# Patient Record
Sex: FEMALE | Race: WHITE | NOT HISPANIC OR LATINO | Employment: FULL TIME | URBAN - METROPOLITAN AREA
[De-identification: names, ages, dates, MRNs, and addresses within clinical notes are randomized per-mention and may not be internally consistent; named-entity substitution may affect disease eponyms.]

---

## 2017-02-14 ENCOUNTER — OFFICE VISIT (OUTPATIENT)
Dept: URGENT CARE | Facility: URGENT CARE | Age: 37
End: 2017-02-14
Payer: COMMERCIAL

## 2017-02-14 VITALS
OXYGEN SATURATION: 98 % | DIASTOLIC BLOOD PRESSURE: 72 MMHG | HEART RATE: 128 BPM | BODY MASS INDEX: 20.76 KG/M2 | SYSTOLIC BLOOD PRESSURE: 122 MMHG | TEMPERATURE: 102.6 F | WEIGHT: 128.6 LBS

## 2017-02-14 DIAGNOSIS — R50.9 FEVER, UNSPECIFIED: ICD-10-CM

## 2017-02-14 DIAGNOSIS — R07.0 THROAT PAIN: ICD-10-CM

## 2017-02-14 DIAGNOSIS — J10.1 INFLUENZA A: Primary | ICD-10-CM

## 2017-02-14 LAB
DEPRECATED S PYO AG THROAT QL EIA: NORMAL
FLUAV+FLUBV AG SPEC QL: ABNORMAL
FLUAV+FLUBV AG SPEC QL: ABNORMAL
MICRO REPORT STATUS: NORMAL
SPECIMEN SOURCE: ABNORMAL
SPECIMEN SOURCE: NORMAL

## 2017-02-14 PROCEDURE — 87880 STREP A ASSAY W/OPTIC: CPT | Performed by: NURSE PRACTITIONER

## 2017-02-14 PROCEDURE — 87804 INFLUENZA ASSAY W/OPTIC: CPT | Performed by: NURSE PRACTITIONER

## 2017-02-14 PROCEDURE — 99213 OFFICE O/P EST LOW 20 MIN: CPT | Performed by: NURSE PRACTITIONER

## 2017-02-14 PROCEDURE — 87081 CULTURE SCREEN ONLY: CPT | Performed by: NURSE PRACTITIONER

## 2017-02-14 RX ORDER — OSELTAMIVIR PHOSPHATE 75 MG/1
75 CAPSULE ORAL 2 TIMES DAILY
Qty: 10 CAPSULE | Refills: 0 | Status: SHIPPED | OUTPATIENT
Start: 2017-02-14 | End: 2017-02-19

## 2017-02-14 NOTE — MR AVS SNAPSHOT
After Visit Summary   2/14/2017    Maria A Osuna    MRN: [id number]           Patient Information     Date Of Birth          1980        Visit Information        Provider Department      2/14/2017 4:10 PM Amalia Navarro NP [facility]        Today's Diagnoses     Fever, unspecified    -  1    Throat pain        Influenza A          Care Instructions      2009 H1N1 Influenza (Swine Flu) (Adult)  The 2009 H1N1 influenza (swine flu) is a respiratory disease caused by the influenza A (H1N1) strain of the influenza viruses. Scientists originally thought the virus came from pigs (swine). But it is now known that this is not the case. This germ is a virus that was discovered in 2009. The 2009 H1N1 flu virus can be passed among people the same way the regular flu spreads--through droplets that form when someone with the virus coughs, sneezes, laughs, or talks. These droplets pass from person to person through the air. You can also become infected if you touch a surface on which the droplets have landed and then transfer the virus to your eyes, nose, or mouth.  2009 H1N1 flu symptoms are about the same as regular flu symptoms. These include fever and chills, headache, body and muscle aches, dry cough, runny nose and weakness. You may also have sore throat, diarrhea, or vomiting.  There is no way to know for sure in the emergency department whether you have 2009 H1N1 or another type of influenza. If the 2009 H1N1 flu is in your area, you may be tested for it. You will be notified when the test results come back. In the meantime, follow the instructions you are given, including the measures below.  Home Care  Note: Seek Immediate Medical Care If You Re Having A Hard Time Breathing Or Are Breathing Very Fast, Or If You Re Starting To Get Confused. (See additional emergency warning signs below under Get Prompt Medical Attention.)  Medications:     If the 2009 H1N1 flu is in your area  and your symptoms are severe, the doctor may prescribe medications called antivirals. These must be taken within 2 days of when your symptoms started. Antivirals work by stopping the virus from reproducing in your body. This gives your body s immune system a chance to fight the virus. After taking the medication, your symptoms may be milder and you may recover quicker than without the medication. The medication may also prevent serious complications such as pneumonia. Mild side effects from these drugs occasionally occur (the chance of side effects is 5% to 10%). Serious side effects are rare. The doctor will decide if this medication is needed. Follow your doctor s instructions for taking these medications. Take ALL medication as prescribed until it is gone.    If your symptoms are mild or it has been more than 2 days since your symptoms started, the doctor will likely not prescribe medications.    Antibiotics are NOT helpful against influenza.    You may use acetaminophen (Tylenol) or ibuprofen (Motrin, Advil) to control fever and muscle aching. (NOTE: If you have chronic liver or kidney disease or ever had a stomach ulcer or GI bleeding, talk with your doctor before using these medicines.) Do not give aspirin to anyone under 18 years of age who is ill with a fever. It may cause severe liver damage.    A vaccine against 2009 H1N1 flu is available. Talk to your doctor about whether the vaccine is right for you.  General Care:     Unless told otherwise by your doctor, drink plenty of non-alcoholic fluids, such as water or juice, to prevent dehydration. A good rule is to drink enough so that you urinate your normal amount.    Get plenty of rest.  Preventing The Spread:     Wash your hands often, especially after coughing or sneezing. Or, clean your hands with an alcohol-based hand gel containing at least 60 percent alcohol.    Cough or sneeze into a tissue. Then throw the tissue away and wash your hands. If you don t  have a tissue, cough or sneeze into the crook of your elbow. Ask your healthcare provider whether you should wear a medical facemask over your mouth and nose to help prevent spreading the virus.    Stay home until your fever has been gone for at least 24 hours and you don t have fever symptoms (such as chills). Be sure the fever isn t being hidden by fever-reducing medications (such as acetaminophen or ibuprofen).    Don t share food, utensils, drinking glasses, or a toothbrush with others.    Have family members wash their hands often. They should avoid touching their eyes, nose, and mouth as much as possible.    Ask your doctor whether others in your household should receive antiviral medication to help them avoid infection.  Follow Up  with your doctor or as directed by our staff if you are not improving over the next week. You will be told how to get test results.  Note:  2009 H1N1 flu is not caused by eating pork or pork products. Eating pork or pork products that have been properly handled and cooked is safe. To learn more about 2009 H1N1 flu, visit the Centers for Disease Control and Prevention (CDC) website: www.cdc.gov/flu/swineflu/index.htm.  Get Prompt Medical Attention  if any of the following occur:    Trouble breathing or shortness of breath    Dizziness or lightheadedness    Chest pain or pressure    Confusion, behavior change, or seizure    Severe or repeated vomiting    4082-8949 [employer]. [address] [place] [address]. All rights reserved. This information is not intended as a substitute for professional medical care. Always follow your healthcare professional's instructions.              Follow-ups after your visit        Who to contact     If you have questions or need follow up information about today's clinic visit or your schedule please contact [facility] directly at [phone].  Normal or non-critical lab and imaging results will be  "communicated to you by [employer], letter or phone within 4 business days after the clinic has received the results. If you do not hear from us within 7 days, please contact the clinic through [facility] [employer] [facility] or phone. If you have a critical or abnormal lab result, we will notify you by phone as soon as possible.  Submit refill requests through [employer] [facility] or call your pharmacy and they will forward the refill request to us. Please allow 3 business days for your refill to be completed.          Additional Information About Your Visit        [facility] [employer] Information     [facility] lets you send messages to your doctor, view your test results, renew your prescriptions, schedule appointments and more. To sign up, go to [email or web address] . Click on \"Log in\" on the left side of the screen, which will take you to the Welcome page. Then click on \"Sign up Now\" on the right side of the page.     You will be asked to enter the access code listed below, as well as some personal information. Please follow the directions to create your username and password.     Your access code is: [email or web address]  Expires: 5/15/2017  6:52 PM     Your access code will  in 90 days. If you need help or a new code, please call your [facility] clinic or [phone].        Care EveryWhere ID     This is your Care EveryWhere ID. This could be used by other organizations to access your [facility] medical records  [id number]        Your Vitals Were     Pulse Temperature Pulse Oximetry BMI (Body Mass Index)          128 102.6  F (39.2  C) (Oral) 98% 20.76 kg/m2         Blood Pressure from Last 3 Encounters:   17 122/72   16 108/74   16 100/60    Weight from Last 3 Encounters:   17 128 lb 9.6 oz (58.3 kg)   16 126 lb (57.2 kg)   16 122 lb 8 oz (55.6 kg)              We Performed the Following     Influenza A/B antigen     Strep, Rapid Screen          Today's Medication Changes          These changes are accurate as of: 17  6:52 " PM.  If you have any questions, ask your nurse or doctor.               Start taking these medicines.        Dose/Directions    oseltamivir 75 MG capsule   Commonly known as:  TAMIFLU   Used for:  Influenza A   Started by:  Amalia Navarro NP        Dose:  75 mg   Take 1 capsule (75 mg) by mouth 2 times daily for 5 days   Quantity:  10 capsule   Refills:  0            Where to get your medicines      These medications were sent to [facility] - [place], [place] - [address]  [address], [facility] [address]     Phone:  [phone]     oseltamivir 75 MG capsule                Primary Care Provider Office Phone # Fax #    Sharon Tijerina [phone]       [facility] [address]  [facility] [address]        Thank you!     Thank you for choosing [facility]  for your care. Our goal is always to provide you with excellent care. Hearing back from our patients is one way we can continue to improve our services. Please take a few minutes to complete the written survey that you may receive in the mail after your visit with us. Thank you!             Your Updated Medication List - Protect others around you: Learn how to safely use, store and throw away your medicines at www.disposemymeds.org.          This list is accurate as of: 2/14/17  6:52 PM.  Always use your most recent med list.                   Brand Name Dispense Instructions for use    FLINSTONES GUMMIES OMEGA-3 DHA PO      Take 2 tablets by mouth daily Reported on 2/14/2017       oseltamivir 75 MG capsule    TAMIFLU    10 capsule    Take 1 capsule (75 mg) by mouth 2 times daily for 5 days       TYLENOL PO      Take 1,000 mg by mouth as needed for mild pain or fever

## 2017-02-15 NOTE — PATIENT INSTRUCTIONS
2009 H1N1 Influenza (Swine Flu) (Adult)  The 2009 H1N1 influenza (swine flu) is a respiratory disease caused by the influenza A (H1N1) strain of the influenza viruses. Scientists originally thought the virus came from pigs (swine). But it is now known that this is not the case. This germ is a virus that was discovered in 2009. The 2009 H1N1 flu virus can be passed among people the same way the regular flu spreads--through droplets that form when someone with the virus coughs, sneezes, laughs, or talks. These droplets pass from person to person through the air. You can also become infected if you touch a surface on which the droplets have landed and then transfer the virus to your eyes, nose, or mouth.  2009 H1N1 flu symptoms are about the same as regular flu symptoms. These include fever and chills, headache, body and muscle aches, dry cough, runny nose and weakness. You may also have sore throat, diarrhea, or vomiting.  There is no way to know for sure in the emergency department whether you have 2009 H1N1 or another type of influenza. If the 2009 H1N1 flu is in your area, you may be tested for it. You will be notified when the test results come back. In the meantime, follow the instructions you are given, including the measures below.  Home Care  Note: Seek Immediate Medical Care If You Re Having A Hard Time Breathing Or Are Breathing Very Fast, Or If You Re Starting To Get Confused. (See additional emergency warning signs below under Get Prompt Medical Attention.)  Medications:     If the 2009 H1N1 flu is in your area and your symptoms are severe, the doctor may prescribe medications called antivirals. These must be taken within 2 days of when your symptoms started. Antivirals work by stopping the virus from reproducing in your body. This gives your body s immune system a chance to fight the virus. After taking the medication, your symptoms may be milder and you may recover quicker than without the medication.  The medication may also prevent serious complications such as pneumonia. Mild side effects from these drugs occasionally occur (the chance of side effects is 5% to 10%). Serious side effects are rare. The doctor will decide if this medication is needed. Follow your doctor s instructions for taking these medications. Take ALL medication as prescribed until it is gone.    If your symptoms are mild or it has been more than 2 days since your symptoms started, the doctor will likely not prescribe medications.    Antibiotics are NOT helpful against influenza.    You may use acetaminophen (Tylenol) or ibuprofen (Motrin, Advil) to control fever and muscle aching. (NOTE: If you have chronic liver or kidney disease or ever had a stomach ulcer or GI bleeding, talk with your doctor before using these medicines.) Do not give aspirin to anyone under 18 years of age who is ill with a fever. It may cause severe liver damage.    A vaccine against 2009 H1N1 flu is available. Talk to your doctor about whether the vaccine is right for you.  General Care:     Unless told otherwise by your doctor, drink plenty of non-alcoholic fluids, such as water or juice, to prevent dehydration. A good rule is to drink enough so that you urinate your normal amount.    Get plenty of rest.  Preventing The Spread:     Wash your hands often, especially after coughing or sneezing. Or, clean your hands with an alcohol-based hand gel containing at least 60 percent alcohol.    Cough or sneeze into a tissue. Then throw the tissue away and wash your hands. If you don t have a tissue, cough or sneeze into the crook of your elbow. Ask your healthcare provider whether you should wear a medical facemask over your mouth and nose to help prevent spreading the virus.    Stay home until your fever has been gone for at least 24 hours and you don t have fever symptoms (such as chills). Be sure the fever isn t being hidden by fever-reducing medications (such as  acetaminophen or ibuprofen).    Don t share food, utensils, drinking glasses, or a toothbrush with others.    Have family members wash their hands often. They should avoid touching their eyes, nose, and mouth as much as possible.    Ask your doctor whether others in your household should receive antiviral medication to help them avoid infection.  Follow Up  with your doctor or as directed by our staff if you are not improving over the next week. You will be told how to get test results.  Note:  2009 H1N1 flu is not caused by eating pork or pork products. Eating pork or pork products that have been properly handled and cooked is safe. To learn more about 2009 H1N1 flu, visit the Centers for Disease Control and Prevention (CDC) website: www.cdc.gov/flu/swineflu/index.htm.  Get Prompt Medical Attention  if any of the following occur:    Trouble breathing or shortness of breath    Dizziness or lightheadedness    Chest pain or pressure    Confusion, behavior change, or seizure    Severe or repeated vomiting    7227-4793 [employer]. [address] [place] [address]. All rights reserved. This information is not intended as a substitute for professional medical care. Always follow your healthcare professional's instructions.

## 2017-02-15 NOTE — PROGRESS NOTES
SUBJECTIVE:                                                    Maria A Osuna is a 36 year old female who presents to clinic today for the following health issues:      RESPIRATORY SYMPTOMS      Duration: 1 week    Description  rhinorrhea, sore throat, facial pain/pressure, cough, wheezing, fever, chills, ear pain both, headache, fatigue/malaise and myalgias    Severity: moderate    Accompanying signs and symptoms: None    History (predisposing factors):  none    Precipitating or alleviating factors: None    Therapies tried and outcome:  rest and fluids nasal spray/wash         No Known Allergies    Past Medical History   Diagnosis Date     Irritable bowel syndrome 5/12/2015         Current Outpatient Prescriptions on File Prior to Visit:  Acetaminophen (TYLENOL PO) Take 1,000 mg by mouth as needed for mild pain or fever   Pediatric Multiple Vit-C-FA (FLINSTONES GUMMIES OMEGA-3 DHA PO) Take 2 tablets by mouth daily Reported on 2/14/2017     Current Facility-Administered Medications on File Prior to Visit:  ROPivacaine (NAROPIN) epidural   lidocaine-EPINEPHrine 1.5 %-[id number] injection       Social History   Substance Use Topics     Smoking status: Never Smoker     Smokeless tobacco: Never Used     Alcohol use No       ROS:  Consitutional: As above  ENT: As above  Respiratory: As above    OBJECTIVE:  /72 (BP Location: Left arm, Patient Position: Chair, Cuff Size: Adult Regular)  Pulse 128  Temp 102.6  F (39.2  C) (Oral)  Wt 128 lb 9.6 oz (58.3 kg)  SpO2 98%  BMI 20.76 kg/m2  GENERAL APPEARANCE: healthy, alert and no distress  EYES: conjunctiva clear  EARS:small cerumen.   Ear canals w/o erythema, TM's intact w/o erythema.    NOSE/MOUTH: Nose and mouth without ulcers, erythema or lesions  THROAT: mild erythema w/ no tonsillar enlargement . no exudates  NECK: supple, nontender, no lymphadenopathy  RESP: lungs clear to auscultation - no rales, rhonchi or wheezes  CV: regular rates and rhythm, normal S1  S2, no murmur noted  NEURO: awake, alert        Results for orders placed or performed in visit on 02/14/17 (from the past 24 hour(s))   Strep, Rapid Screen   Result Value Ref Range    Specimen Description Throat     Rapid Strep A Screen       NEGATIVE: No Group A streptococcal antigen detected by immunoassay, await   culture report.      Micro Report Status FINAL 02/14/2017    Influenza A/B antigen   Result Value Ref Range    Influenza A/B Agn Specimen Nasal     Influenza A (A) NEG     Positive   Test results must be correlated with clinical data. If necessary, results   should be confirmed by a molecular assay or viral culture.      Influenza B  NEG     Negative   Test results must be correlated with clinical data. If necessary, results   should be confirmed by a molecular assay or viral culture.       ASSESSMENT: ill appearing.    ICD-10-CM    1. Influenza A J10.1 oseltamivir (TAMIFLU) 75 MG capsule   2. Fever, unspecified R50.9 Influenza A/B antigen   3. Throat pain R07.0 Strep, Rapid Screen         PLAN:  I discussed the pathophysiology of influenza and viral etiology.  I reviewed the risks and benefits of various over the counter and prescription medications.  Additionally, we reviewed the infectious nature of this condition and techniques to minimize transmission and future infections. I discussed warning signs of worsening infection, if he deviates from the expected course, he will contact us.  I also discussed signs and symptoms of the flu, if other household contacts develop these symptoms, they are to come into the clinic immediately, as anti-viral medications need to be started within the first 48 hours.      Lots of rest and fluids.  RTC if any worsening symptoms or if not improving.    Amalia Navarro [facility]

## 2017-02-15 NOTE — NURSING NOTE
"Chief Complaint   Patient presents with     URI       Initial /72 (BP Location: Left arm, Patient Position: Chair, Cuff Size: Adult Regular)  Pulse 128  Temp 102.6  F (39.2  C) (Oral)  Wt 128 lb 9.6 oz (58.3 kg)  SpO2 98%  BMI 20.76 kg/m2 Estimated body mass index is 20.76 kg/(m^2) as calculated from the following:    Height as of 2/8/16: 5' 6\" (1.676 m).    Weight as of this encounter: 128 lb 9.6 oz (58.3 kg).  Medication Reconciliation: complete     Shivani Reis MA    "

## 2017-02-16 LAB
BACTERIA SPEC CULT: NORMAL
MICRO REPORT STATUS: NORMAL
SPECIMEN SOURCE: NORMAL

## 2017-03-27 ENCOUNTER — OFFICE VISIT (OUTPATIENT)
Dept: FAMILY MEDICINE | Facility: CLINIC | Age: 37
End: 2017-03-27
Payer: COMMERCIAL

## 2017-03-27 VITALS
WEIGHT: 127.4 LBS | SYSTOLIC BLOOD PRESSURE: 123 MMHG | TEMPERATURE: 98.1 F | HEIGHT: 66 IN | BODY MASS INDEX: 20.48 KG/M2 | DIASTOLIC BLOOD PRESSURE: 78 MMHG | HEART RATE: 96 BPM | OXYGEN SATURATION: 99 %

## 2017-03-27 DIAGNOSIS — R07.0 THROAT PAIN: Primary | ICD-10-CM

## 2017-03-27 DIAGNOSIS — K12.0 ORAL APHTHOUS ULCER: ICD-10-CM

## 2017-03-27 LAB
DEPRECATED S PYO AG THROAT QL EIA: NORMAL
MICRO REPORT STATUS: NORMAL
SPECIMEN SOURCE: NORMAL

## 2017-03-27 PROCEDURE — 87081 CULTURE SCREEN ONLY: CPT | Performed by: NURSE PRACTITIONER

## 2017-03-27 PROCEDURE — 99214 OFFICE O/P EST MOD 30 MIN: CPT | Performed by: NURSE PRACTITIONER

## 2017-03-27 PROCEDURE — 87880 STREP A ASSAY W/OPTIC: CPT | Performed by: NURSE PRACTITIONER

## 2017-03-27 NOTE — PATIENT INSTRUCTIONS
Use mouth wash or salt water gargling to help lesion resolve quicker/ pain resolution. Follow up if symptoms persit or worsen, then I will place an order for ENT evaluation.   Self-Care for Sore Throats  Sore throats occur for many reasons, such as colds, allergies, and infections caused by viruses or bacteria. In any case, your throat becomes red and sore. Your goal for self-care is to reduce your discomfort while giving your throat a chance to heal.    Moisten and Soothe Your Throat    Try a sip of water first thing after waking up.    Keep your throat moist by drinking 6 or more glasses of clear liquids every day.    Run a cool-air humidifier in your room overnight.    Avoid cigarette smoke.     Suck on throat lozenges, cough drops, hard candy, ice chips, or frozen fruit-juice bars. Use the sugar-free versions if your diet or medical condition require them.  Gargle to Ease Irritation  Gargling every hour or 2 can ease irritation. Try gargling with 1 of these solutions:    1/4 teaspoon of salt in 1/2 cup of warm water    An over-the-counter anesthetic gargle  Use Medication for More Relief  Over-the-counter medication can reduce sore throat symptoms. Ask your pharmacist if you have questions about which medication to use:    Ease pain with anesthetic sprays. Aspirin or an aspirin substitute also helps. Remember, never give aspirin to anyone 18 or younger, or if you are already taking blood thinners.     For sore throats caused by allergies, try antihistamines to block the allergic reaction.    Remember: unless a sore throat is caused by a bacterial infection, antibiotics won t help you.  Prevent Future Sore Throats    Stop smoking or reduce contact with secondhand smoke. Smoke irritates the tender throat lining.    Limit contact with pets and with allergy-causing substances, such as pollen and mold.    When you re around someone with a sore throat or cold, wash your hands frequently to keep viruses or bacteria  from spreading.    Don t strain your vocal cords.  Call Your Health Care Provider  Contact your doctor if you have:    A temperature over 101 F (38.3 C)    White spots on the throat    Great difficulty swallowing    Trouble breathing    A skin rash    Recent exposure to someone else with strep bacteria    Severe hoarseness and swollen glands in the neck or jaw     7485-7942 [employer]. [address] [place] [address]. All rights reserved. This information is not intended as a substitute for professional medical care. Always follow your healthcare professional's instructions.

## 2017-03-27 NOTE — NURSING NOTE
"Chief Complaint   Patient presents with     Ent Problem       Initial /78  Pulse 96  Temp 98.1  F (36.7  C) (Oral)  Ht 5' 6.14\" (1.68 m)  Wt 127 lb 6.4 oz (57.8 kg)  LMP 03/23/2017 (Approximate)  SpO2 99%  Breastfeeding? No  BMI 20.47 kg/m2 Estimated body mass index is 20.47 kg/(m^2) as calculated from the following:    Height as of this encounter: 5' 6.14\" (1.68 m).    Weight as of this encounter: 127 lb 6.4 oz (57.8 kg).  Medication Reconciliation: complete     Rosa Isela Coffey MA  "

## 2017-03-27 NOTE — MR AVS SNAPSHOT
After Visit Summary   3/27/2017    Maria A Osuna    MRN: [id number]           Patient Information     Date Of Birth          1980        Visit Information        Provider Department      3/27/2017 9:20 AM Maria A Keys NP [facility]        Today's Diagnoses     Throat pain    -  1      Care Instructions    Use mouth wash or salt water gargling to help lesion resolve quicker/ pain resolution. Follow up if symptoms persit or worsen, then I will place an order for ENT evaluation.   Self-Care for Sore Throats  Sore throats occur for many reasons, such as colds, allergies, and infections caused by viruses or bacteria. In any case, your throat becomes red and sore. Your goal for self-care is to reduce your discomfort while giving your throat a chance to heal.    Moisten and Soothe Your Throat    Try a sip of water first thing after waking up.    Keep your throat moist by drinking 6 or more glasses of clear liquids every day.    Run a cool-air humidifier in your room overnight.    Avoid cigarette smoke.     Suck on throat lozenges, cough drops, hard candy, ice chips, or frozen fruit-juice bars. Use the sugar-free versions if your diet or medical condition require them.  Gargle to Ease Irritation  Gargling every hour or 2 can ease irritation. Try gargling with 1 of these solutions:    1/4 teaspoon of salt in 1/2 cup of warm water    An over-the-counter anesthetic gargle  Use Medication for More Relief  Over-the-counter medication can reduce sore throat symptoms. Ask your pharmacist if you have questions about which medication to use:    Ease pain with anesthetic sprays. Aspirin or an aspirin substitute also helps. Remember, never give aspirin to anyone 18 or younger, or if you are already taking blood thinners.     For sore throats caused by allergies, try antihistamines to block the allergic reaction.    Remember: unless a sore throat is caused by a bacterial infection,  antibiotics won t help you.  Prevent Future Sore Throats    Stop smoking or reduce contact with secondhand smoke. Smoke irritates the tender throat lining.    Limit contact with pets and with allergy-causing substances, such as pollen and mold.    When you re around someone with a sore throat or cold, wash your hands frequently to keep viruses or bacteria from spreading.    Don t strain your vocal cords.  Call Your Health Care Provider  Contact your doctor if you have:    A temperature over 101 F (38.3 C)    White spots on the throat    Great difficulty swallowing    Trouble breathing    A skin rash    Recent exposure to someone else with strep bacteria    Severe hoarseness and swollen glands in the neck or jaw     7933-3451 [employer]. [address] [place] [address]. All rights reserved. This information is not intended as a substitute for professional medical care. Always follow your healthcare professional's instructions.              Follow-ups after your visit        Follow-up notes from your care team     Return if symptoms worsen or fail to improve.      Who to contact     Normal or non-critical lab and imaging results will be communicated to you by [employer], letter or phone within 4 business days after the clinic has received the results. If you do not hear from us within 7 days, please contact the clinic through [employer] or phone. If you have a critical or abnormal lab result, we will notify you by phone as soon as possible.  Submit refill requests through [employer] or call your pharmacy and they will forward the refill request to us. Please allow 3 business days for your refill to be completed.          If you need to speak with a  for additional information , please call: [phone]             Additional Information About Your Visit        [employer] Information     [employer] gives you secure access to your electronic health record. If you see a primary care provider,  "you can also send messages to your care team and make appointments. If you have questions, please call your primary care clinic.  If you do not have a primary care provider, please call [phone] and they will assist you.        Care EveryWhere ID     This is your Care EveryWhere ID. This could be used by other organizations to access your [facility] medical records  [id number]        Your Vitals Were     Pulse Temperature Height Last Period Pulse Oximetry Breastfeeding?    96 98.1  F (36.7  C) (Oral) 5' 6.14\" (1.68 m) 03/23/2017 (Approximate) 99% No    BMI (Body Mass Index)                   20.47 kg/m2            Blood Pressure from Last 3 Encounters:   03/27/17 123/78   02/14/17 122/72   12/31/16 108/74    Weight from Last 3 Encounters:   03/27/17 127 lb 6.4 oz (57.8 kg)   02/14/17 128 lb 9.6 oz (58.3 kg)   12/31/16 126 lb (57.2 kg)              We Performed the Following     Strep, Rapid Screen          Today's Medication Changes          These changes are accurate as of: 3/27/17  9:28 AM.  If you have any questions, ask your nurse or doctor.               Start taking these medicines.        Dose/Directions    FIRST-DELIA MOUTHWASH Susp   Used for:  Throat pain   Started by:  Maria A Keys NP        Dose:  5-10 mL   Swish and swallow 5-10 mLs in mouth every 6 hours as needed   Quantity:  237 mL   Refills:  1         Stop taking these medicines if you haven't already. Please contact your care team if you have questions.     FLINSTONES GUMMIES OMEGA-3 DHA PO   Stopped by:  Maria A Keys NP           TYLENOL PO   Stopped by:  Maria A Keys NP                Where to get your medicines      These medications were sent to [facility] [address] [facility] [place]  [address] [facility] [place] [address]     Phone:  [phone]     FIRST-DELIA MOUTHWASH Susp                Primary Care Provider Office Phone # Fax #    Sharon Edwina Tijerina [phone]       [facility] " [facility] [address]  [facility] [address]        Thank you!     Thank you for choosing [facility]  for your care. Our goal is always to provide you with excellent care. Hearing back from our patients is one way we can continue to improve our services. Please take a few minutes to complete the written survey that you may receive in the mail after your visit with us. Thank you!             Your Updated Medication List - Protect others around you: Learn how to safely use, store and throw away your medicines at www.disposemymeds.org.          This list is accurate as of: 3/27/17  9:28 AM.  Always use your most recent med list.                   Brand Name Dispense Instructions for use    FIRST-DEILA MOUTHWASH Susp     237 mL    Swish and swallow 5-10 mLs in mouth every 6 hours as needed

## 2017-03-27 NOTE — PROGRESS NOTES
SUBJECTIVE:  Maria A Osuna is a 36 year old female who presents with the following concerns;              Symptoms: cc Present Absent Comment   Fever/Chills       Fatigue       Muscle Aches       Eye Irritation       Sneezing       Nasal Quinn/Drg       Sinus Pressure/Pain       Loss of smell       Dental pain       Sore Throat       Swollen Glands  x  Left side swollen gland, pain with swollowing   Ear Pain/Fullness  x  Left ear   Cough       Wheeze       Chest Pain       Shortness of breath       Rash       Other         Symptom duration:  2 months   Sympom severity:  mild   Treatments tried:  had been treated for flu and sinus infection   Contacts:  none     Was recently treated for influenza and sinus infection    Medications updated and reviewed.  Past, family and surgical history is updated and reviewed in the record.  Patient Active Problem List    Diagnosis Date Noted     Irritable bowel syndrome 05/12/2015     Priority: Medium     Past Medical History:   Diagnosis Date     Irritable bowel syndrome 5/12/2015      History reviewed. No pertinent family history.  ROS:  Other than noted above, general, HEENT, respiratory, cardiac and gastrointestinal systems are negative.    OBJECTIVE:  GENERAL:  Alert, no acute distress  EYES:  PERRL, EOM normal, conjunctiva and lids normal  HEENT:  Ears and TMs normal, oral mucosa and posterior oropharynx normal, small viral lesion to L tonsillar area. Tonsils are not erythematous or enlarged, no tonsil stone present, no suspected cancerous lesion present.   RESP:  Lungs clear to auscultation.  CV:  Normal rate, regular rhythm, no murmur or gallop.  LYMPHATICS:  No cervical, supraclavicular adenopathy  NEURO:  Alert, oriented, speech and mentation normal    Assessment/Plan:     ICD-10-CM    1. Throat pain R07.0 Strep, Rapid Screen     Diphenhyd-HC-Nystatin-Tetracyc (FIRST-DELIA MOUTHWASH) SUSP    painful viral throat lesion   2. Oral aphthous ulcer K12.0         See  patient instructions: Use mouth wash or salt water gargling to help lesion resolve quicker/ pain resolution. Follow up if symptoms persit or worsen, then I will place an order for ENT evaluation.     EVELIA Bazzi, FNP-BC

## 2017-03-29 LAB
BACTERIA SPEC CULT: NORMAL
MICRO REPORT STATUS: NORMAL
SPECIMEN SOURCE: NORMAL

## 2017-03-30 NOTE — PROGRESS NOTES
Red Saha,    Thank you for your recent office visit.    Here are your recent results.  Your strep culture is negative, please follow up if your symptoms persist or worsen.       Feel free to contact me via [employer] or call the clinic at [phone].    Sincerely,    EVELIA Bazzi, FNP-BC

## 2017-04-11 ENCOUNTER — OFFICE VISIT (OUTPATIENT)
Dept: FAMILY MEDICINE | Facility: CLINIC | Age: 37
End: 2017-04-11
Payer: COMMERCIAL

## 2017-04-11 VITALS
WEIGHT: 127 LBS | RESPIRATION RATE: 16 BRPM | TEMPERATURE: 98.3 F | DIASTOLIC BLOOD PRESSURE: 77 MMHG | OXYGEN SATURATION: 99 % | HEIGHT: 66 IN | BODY MASS INDEX: 20.41 KG/M2 | HEART RATE: 90 BPM | SYSTOLIC BLOOD PRESSURE: 114 MMHG

## 2017-04-11 DIAGNOSIS — J35.8 CRYPTIC TONSIL: Primary | ICD-10-CM

## 2017-04-11 LAB
DEPRECATED S PYO AG THROAT QL EIA: NORMAL
MICRO REPORT STATUS: NORMAL
SPECIMEN SOURCE: NORMAL

## 2017-04-11 PROCEDURE — 87081 CULTURE SCREEN ONLY: CPT | Performed by: PHYSICIAN ASSISTANT

## 2017-04-11 PROCEDURE — 99213 OFFICE O/P EST LOW 20 MIN: CPT | Performed by: PHYSICIAN ASSISTANT

## 2017-04-11 PROCEDURE — 87880 STREP A ASSAY W/OPTIC: CPT | Performed by: PHYSICIAN ASSISTANT

## 2017-04-11 ASSESSMENT — PAIN SCALES - GENERAL: PAINLEVEL: NO PAIN (0)

## 2017-04-11 NOTE — NURSING NOTE
"Chief Complaint   Patient presents with     Throat Problem       Initial /77 (BP Location: Left arm, Patient Position: Chair, Cuff Size: Adult Regular)  Pulse 90  Temp 98.3  F (36.8  C) (Oral)  Resp 16  Ht 5' 6.25\" (1.683 m)  Wt 127 lb (57.6 kg)  LMP 03/22/2017 (Exact Date)  SpO2 99%  Breastfeeding? No  BMI 20.34 kg/m2 Estimated body mass index is 20.34 kg/(m^2) as calculated from the following:    Height as of this encounter: 5' 6.25\" (1.683 m).    Weight as of this encounter: 127 lb (57.6 kg).  Medication Reconciliation: complete     Casandra Mensah MA       "

## 2017-04-11 NOTE — MR AVS SNAPSHOT
After Visit Summary   4/11/2017    Maria A Osuna    MRN: [id number]           Patient Information     Date Of Birth          1980        Visit Information        Provider Department      4/11/2017 7:00 AM Anabella Silva PA-C [facility]        Today's Diagnoses     Cryptic tonsil    -  1      Care Instructions      Gurgle with warm salt water 4 times a day  Ibuprofen may help shrink the tonsil down  See ENT to address cryptic tonsils and tonsil stones  Adult Tonsillectomy  The tonsils are 2 small masses of tissue at the back of the throat. They are part of the body s immune system. This helps the body fight disease. In some people, the tonsils become infected or enlarged. This can cause severe sore throats, snoring, or other problems. Tonsillectomy is surgery to remove the tonsils. Tonsillectomy may be recommended if you have obstruction causing sleep apnea, or recurring, chronic, or severe infections. This sheet tells you more about this surgery and what to expect.    Preparing for surgery  Prepare as you have been told. Tell your healthcare provider about all medicine you take. This includes over-the-counter drugs. It also includes herbs and other supplements. You may need to stop taking some or all of them before surgery as directed by your healthcare provider. Also, follow any directions you re given for not eating or drinking before surgery.  The day of surgery  The surgery takes about 60 minutes. You will likely go home on the same day.  Before the surgery  Here is what to expect before the surgery begins:    An IV line is put into a vein in your arm or hand. This line supplies fluids and medicines.    To keep you free of pain during the surgery, you re given general anesthesia. This medicine puts you into a state like deep sleep through the surgery.  During the surgery  Here is what to expect during the surgery:    A special device is used to keep  the mouth open.    Other tools are used to remove the tonsils from the back of the throat. The tissue is taken out through the mouth.    The device holding the mouth open is then removed.  After the surgery  You will be taken to a recovery room. Healthcare staff will make sure you can drink some liquids. They will also make sure your pain is being managed. When you are ready to leave the hospital, you will need to be driven home by an adult family member or friend.  Recovering at home  It will likely take about 2 weeks to heal from the surgery. During your recovery:    Expect to have throat pain. You may also feel pain in your ears. This is  referred  pain from the throat, and is normal. Your post-surgery pain may come and go. It may be worse on the 4th or 5th day after surgery.    Talk as little as possible, if it is painful.    Take pain medicine as directed.    Do not drive while you are on opioid or narcotic pain medicine. Expect to feel sleepy or dizzy while you are taking this medicine.    Do not use ibuprofen or aspirin for 14 days after surgery unless your healthcare provider says it s OK. You may use acetaminophen as directed.    Use 2 or 3 pillows under your head while resting. This will help keep swelling down.    Drink lots of chilled liquids. Water, noncitrus juices, and frozen juice bars are good choices.    Eat cold foods and soft foods, which are easiest to swallow. Try foods like ice cream, gelatin, scrambled eggs, pasta, and mashed potatoes.    Avoid foods that require a lot of chewing. Also avoid foods that may scratch the throat, like toast or potato chips. Avoid hot, spicy, or acidic foods.    Avoid strenuous activity for 2 to 3 weeks after surgery.    Be aware that white patches will form in the throat during healing. These are scabs and are not a sign of infection. The patches will come off in 1 to 2 weeks and may cause bleeding. To minimize bleeding, drink lots of fluids. Gargling with cold  water can help.  Call the healthcare provider  Call your healthcare provider if you have any of the following:    Chest pain or trouble breathing (call 911)    Fever of 100.4 F (38 C) or higher, or as directed by your healthcare provider    Bright red bleeding from the mouth or nose    Severe pain not relieved by medicine    Signs of dehydration (dark urine, urinating less often)    Heavy or persistent bleeding in the throat at any time    Other signs or symptoms as indicated by your healthcare provider   Follow-up  Schedule a follow-up visit with your healthcare provider as advised. During this visit, the healthcare provider will make sure you are healing well. Ask any questions you have about the surgery or your recovery.  Risks and possible complications   Risks of this surgery include:    Infection    Bleeding    Injury to the lips or teeth    Painful swallowing during recovery    The need for a second surgery    Risks of anesthesia      6845-6580 [employer]. [address] [place] [address]. All rights reserved. This information is not intended as a substitute for professional medical care. Always follow your healthcare professional's instructions.              Follow-ups after your visit        Additional Services     OTOLARYNGOLOGY REFERRAL       Your provider has referred you to: [facility]: [facility] [phone]   [email or web address]    Please be aware that coverage of these services is subject to the terms and limitations of your health insurance plan.  Call member services at your health plan with any benefit or coverage questions.      Please bring the following with you to your appointment:    (1) Any X-Rays, CTs or MRIs which have been performed.  Contact the facility where they were done to arrange for  prior to your scheduled appointment.   (2) List of current medications  (3) This referral request   (4) Any  "documents/labs given to you for this referral                  Who to contact     If you have questions or need follow up information about today's clinic visit or your schedule please contact [facility] directly at [phone].  Normal or non-critical lab and imaging results will be communicated to you by MyChart, letter or phone within 4 business days after the clinic has received the results. If you do not hear from us within 7 days, please contact the clinic through [employer] or phone. If you have a critical or abnormal lab result, we will notify you by phone as soon as possible.  Submit refill requests through [employer] or call your pharmacy and they will forward the refill request to us. Please allow 3 business days for your refill to be completed.          Additional Information About Your Visit        [employer] Information     [employer] gives you secure access to your electronic health record. If you see a primary care provider, you can also send messages to your care team and make appointments. If you have questions, please call your primary care clinic.  If you do not have a primary care provider, please call [phone] and they will assist you.        Care EveryWhere ID     This is your Care EveryWhere ID. This could be used by other organizations to access your [facility] medical records  [id number]        Your Vitals Were     Pulse Temperature Respirations Height Last Period Pulse Oximetry    90 98.3  F (36.8  C) (Oral) 16 5' 6.25\" (1.683 m) 03/22/2017 (Exact Date) 99%    Breastfeeding? BMI (Body Mass Index)                No 20.34 kg/m2           Blood Pressure from Last 3 Encounters:   04/11/17 114/77   03/27/17 123/78   02/14/17 122/72    Weight from Last 3 Encounters:   04/11/17 127 lb (57.6 kg)   03/27/17 127 lb 6.4 oz (57.8 kg)   02/14/17 128 lb 9.6 oz (58.3 kg)              We Performed the Following     Beta strep group A culture     OTOLARYNGOLOGY REFERRAL     Strep, Rapid Screen "        Primary Care Provider Office Phone # Fax #    Sharon Edwina Tijerina [phone]       [facility] [address]  [facility] [address]        Thank you!     Thank you for choosing [facility]  for your care. Our goal is always to provide you with excellent care. Hearing back from our patients is one way we can continue to improve our services. Please take a few minutes to complete the written survey that you may receive in the mail after your visit with us. Thank you!             Your Updated Medication List - Protect others around you: Learn how to safely use, store and throw away your medicines at www.disposemymeds.org.      Notice  As of 4/11/2017  7:47 AM    You have not been prescribed any medications.

## 2017-04-11 NOTE — PATIENT INSTRUCTIONS
Gurgle with warm salt water 4 times a day  Ibuprofen may help shrink the tonsil down  See ENT to address cryptic tonsils and tonsil stones  Adult Tonsillectomy  The tonsils are 2 small masses of tissue at the back of the throat. They are part of the body s immune system. This helps the body fight disease. In some people, the tonsils become infected or enlarged. This can cause severe sore throats, snoring, or other problems. Tonsillectomy is surgery to remove the tonsils. Tonsillectomy may be recommended if you have obstruction causing sleep apnea, or recurring, chronic, or severe infections. This sheet tells you more about this surgery and what to expect.    Preparing for surgery  Prepare as you have been told. Tell your healthcare provider about all medicine you take. This includes over-the-counter drugs. It also includes herbs and other supplements. You may need to stop taking some or all of them before surgery as directed by your healthcare provider. Also, follow any directions you re given for not eating or drinking before surgery.  The day of surgery  The surgery takes about 60 minutes. You will likely go home on the same day.  Before the surgery  Here is what to expect before the surgery begins:    An IV line is put into a vein in your arm or hand. This line supplies fluids and medicines.    To keep you free of pain during the surgery, you re given general anesthesia. This medicine puts you into a state like deep sleep through the surgery.  During the surgery  Here is what to expect during the surgery:    A special device is used to keep the mouth open.    Other tools are used to remove the tonsils from the back of the throat. The tissue is taken out through the mouth.    The device holding the mouth open is then removed.  After the surgery  You will be taken to a recovery room. Healthcare staff will make sure you can drink some liquids. They will also make sure your pain is being managed. When you are ready  to leave the hospital, you will need to be driven home by an adult family member or friend.  Recovering at home  It will likely take about 2 weeks to heal from the surgery. During your recovery:    Expect to have throat pain. You may also feel pain in your ears. This is  referred  pain from the throat, and is normal. Your post-surgery pain may come and go. It may be worse on the 4th or 5th day after surgery.    Talk as little as possible, if it is painful.    Take pain medicine as directed.    Do not drive while you are on opioid or narcotic pain medicine. Expect to feel sleepy or dizzy while you are taking this medicine.    Do not use ibuprofen or aspirin for 14 days after surgery unless your healthcare provider says it s OK. You may use acetaminophen as directed.    Use 2 or 3 pillows under your head while resting. This will help keep swelling down.    Drink lots of chilled liquids. Water, noncitrus juices, and frozen juice bars are good choices.    Eat cold foods and soft foods, which are easiest to swallow. Try foods like ice cream, gelatin, scrambled eggs, pasta, and mashed potatoes.    Avoid foods that require a lot of chewing. Also avoid foods that may scratch the throat, like toast or potato chips. Avoid hot, spicy, or acidic foods.    Avoid strenuous activity for 2 to 3 weeks after surgery.    Be aware that white patches will form in the throat during healing. These are scabs and are not a sign of infection. The patches will come off in 1 to 2 weeks and may cause bleeding. To minimize bleeding, drink lots of fluids. Gargling with cold water can help.  Call the healthcare provider  Call your healthcare provider if you have any of the following:    Chest pain or trouble breathing (call 911)    Fever of 100.4 F (38 C) or higher, or as directed by your healthcare provider    Bright red bleeding from the mouth or nose    Severe pain not relieved by medicine    Signs of dehydration (dark urine, urinating less  often)    Heavy or persistent bleeding in the throat at any time    Other signs or symptoms as indicated by your healthcare provider   Follow-up  Schedule a follow-up visit with your healthcare provider as advised. During this visit, the healthcare provider will make sure you are healing well. Ask any questions you have about the surgery or your recovery.  Risks and possible complications   Risks of this surgery include:    Infection    Bleeding    Injury to the lips or teeth    Painful swallowing during recovery    The need for a second surgery    Risks of anesthesia      6050-9642 [employer]. [address] [place] [address]. All rights reserved. This information is not intended as a substitute for professional medical care. Always follow your healthcare professional's instructions.

## 2017-04-11 NOTE — PROGRESS NOTES
"  SUBJECTIVE:                                                    Maria A Osuna is a 37 year old female who presents to clinic today for the following health issues:      Concern - bump on back of throat     Onset: 2 weeks    Description:   Bump on tonsills. White head on the right side and \"bubbly\" tonsil on the right     Intensity: mild    Progression of Symptoms:  worsening    Accompanying Signs & Symptoms:  Feels it when she swallows and also started to develop tonsil stones in the last 3 weeks. She coughs them up.       Previous history of similar problem:   no    Precipitating factors:   Worsened by: had influenza and then sinusitis in the last 2 month.     Alleviating factors:  Improved by: none       Therapies Tried and outcome: none          Problem list and histories reviewed & adjusted, as indicated.  Additional history: as documented    Patient Active Problem List   Diagnosis     Irritable bowel syndrome     Past Surgical History:   Procedure Laterality Date     wisdom teeth[  2000       Social History   Substance Use Topics     Smoking status: Never Smoker     Smokeless tobacco: Never Used     Alcohol use No     History reviewed. No pertinent family history.      No current outpatient prescriptions on file.     No Known Allergies    Reviewed and updated as needed this visit by clinical staff       Reviewed and updated as needed this visit by Provider         ROS:  Constitutional, HEENT, cardiovascular, pulmonary, gi and gu systems are negative, except as otherwise noted.    OBJECTIVE:                                                    /77 (BP Location: Left arm, Patient Position: Chair, Cuff Size: Adult Regular)  Pulse 90  Temp 98.3  F (36.8  C) (Oral)  Resp 16  Ht 5' 6.25\" (1.683 m)  Wt 127 lb (57.6 kg)  LMP 03/22/2017 (Exact Date)  SpO2 99%  Breastfeeding? No  BMI 20.34 kg/m2  Body mass index is 20.34 kg/(m^2).  GENERAL: healthy, alert and no distress  HENT: normal " cephalic/atraumatic, ear canals and TM's normal, nose and mouth without ulcers or lesions, oral mucous membranes moist and tonsillar hypertrophy at 2+ on the left with visible tonsillar crypts, single white pustule on the right 1+ tonsil.  NECK: no adenopathy, no asymmetry, masses, or scars and thyroid normal to palpation  MS: no gross musculoskeletal defects noted, no edema    Diagnostic Test Results:  Results for orders placed or performed in visit on 04/11/17 (from the past 24 hour(s))   Strep, Rapid Screen   Result Value Ref Range    Specimen Description Throat     Rapid Strep A Screen       NEGATIVE: No Group A streptococcal antigen detected by immunoassay, await   culture report.      Micro Report Status FINAL 04/11/2017         ASSESSMENT/PLAN:                                                        ICD-10-CM    1. Cryptic tonsil J35.8 Strep, Rapid Screen     OTOLARYNGOLOGY REFERRAL     Beta strep group A culture   discussed that most likely this is a temporary problem due to recent infections.   Patient will gurgle with warm salt water  Ibuprofen   If not better in 3-4 weeks patient will see ENT to discuss treatment options.         Anabella Silva PA-C  [facility]

## 2017-04-13 LAB
BACTERIA SPEC CULT: NORMAL
MICRO REPORT STATUS: NORMAL
SPECIMEN SOURCE: NORMAL

## 2017-04-25 ENCOUNTER — OFFICE VISIT (OUTPATIENT)
Dept: OTOLARYNGOLOGY | Facility: CLINIC | Age: 37
End: 2017-04-25
Payer: COMMERCIAL

## 2017-04-25 VITALS — BODY MASS INDEX: 20.09 KG/M2 | HEIGHT: 66 IN | RESPIRATION RATE: 12 BRPM | WEIGHT: 125 LBS

## 2017-04-25 DIAGNOSIS — J35.01 CHRONIC TONSILLITIS: Primary | ICD-10-CM

## 2017-04-25 PROCEDURE — 99204 OFFICE O/P NEW MOD 45 MIN: CPT | Performed by: OTOLARYNGOLOGY

## 2017-04-25 NOTE — MR AVS SNAPSHOT
After Visit Summary   4/25/2017    Maria A Osuna    MRN: [id number]           Patient Information     Date Of Birth          1980        Visit Information        Provider Department      4/25/2017 8:30 AM Luis F Phillips MD [facility]        Today's Diagnoses     Chronic tonsillitis    -  1      Care Instructions    Scheduling Information  To schedule your CT/MRI scan, please contact [facility] at [phone] OR [facility] at [phone]    To schedule your Surgery, please contact our Specialty Schedulers at [phone]      ENT Clinic Locations Clinic Hours Telephone Number     [facility]  [address]  [place] [address]   Monday:           1:00pm -- 5:00pm    Friday:              8:00am - 12:00pm   To schedule/reschedule an appointment with   Dr. Phillips,   please contact our   Specialty Scheduling Department at:     [phone]       [facility]  [address] [place] [address] Tuesday:          8:00am -- 2:00pm         Urgent Care Locations Clinic Hours Telephone Numbers     [facility]  [address] [place] [address]     Monday-Friday:     11:00am - 9:00pm    Saturday-Sunday:  9:00am - 5:00pm   [phone]     [facility]  [address] [place] [address]     Monday-Friday:      5:00pm - 9:00pm     Saturday-Sunday:  9:00am - 5:00pm   [phone]               Follow-ups after your visit        Who to contact     If you have questions or need follow up information about today's clinic visit or your schedule please contact [facility] directly at [phone].  Normal or non-critical lab and imaging results will be communicated to you by MyChart, letter or phone within 4 business days after the clinic has received the results. If you do not hear from us within 7 days, please contact the clinic through MyChart or phone. If you have a critical or abnormal  "lab result, we will notify you by phone as soon as possible.  Submit refill requests through [employer] or call your pharmacy and they will forward the refill request to us. Please allow 3 business days for your refill to be completed.          Additional Information About Your Visit        [employer] Information     [employer] gives you secure access to your electronic health record. If you see a primary care provider, you can also send messages to your care team and make appointments. If you have questions, please call your primary care clinic.  If you do not have a primary care provider, please call [phone] and they will assist you.        Care EveryWhere ID     This is your Care EveryWhere ID. This could be used by other organizations to access your [place] medical records  [id number]        Your Vitals Were     Respirations Height Last Period BMI (Body Mass Index)          12 1.676 m (5' 6\") 03/22/2017 (Exact Date) 20.18 kg/m2         Blood Pressure from Last 3 Encounters:   04/11/17 114/77   03/27/17 123/78   02/14/17 122/72    Weight from Last 3 Encounters:   04/25/17 56.7 kg (125 lb)   04/11/17 57.6 kg (127 lb)   03/27/17 57.8 kg (127 lb 6.4 oz)              Today, you had the following     No orders found for display       Primary Care Provider Office Phone # Fax #    Sharon Edwina Tijerina [phone]       [facility] [address]  [facility] system [address]        Thank you!     Thank you for choosing [facility]  for your care. Our goal is always to provide you with excellent care. Hearing back from our patients is one way we can continue to improve our services. Please take a few minutes to complete the written survey that you may receive in the mail after your visit with us. Thank you!             Your Updated Medication List - Protect others around you: Learn how to safely use, store and throw away your medicines at www.disposemymeds.org.      Notice  As " of 4/25/2017  8:51 AM    You have not been prescribed any medications.

## 2017-04-25 NOTE — PROGRESS NOTES
"History of Present Illness - Maria A Osuna is a 37 year old female here to see me for the first time from Dr. Silva for a tonsil lesion.    This all started in January of this year, when she had proven Influenza.  since then she has had recurrent episodes of tonsil stones and tonsillitis that has been low grade.  HOwever, these tonsil infections are now occurring every other week with low grade fevers as well.  She does have known tonsil stones, but nothing this frequent.  She has noticed halitosis, and a bad taste.    No previous ENT surgery, no dysphagia, no coughing up blood, no change in voice.    Past Medical History -   Patient Active Problem List   Diagnosis     Irritable bowel syndrome       Current Medications - No current outpatient prescriptions on file.  No current facility-administered medications for this visit.     Facility-Administered Medications Ordered in Other Visits:      ROPivacaine (NAROPIN) epidural, , , PRN, Thyr, Kwame D, 10 mL at 08/06/14 0619     lidocaine-EPINEPHrine 1.5 %-[id number] injection, , , PRN, Thyr, Kwame D, 3 mL at 08/06/14 0616    Allergies - No Known Allergies    Social History -   Social History     Social History     Marital status:      Spouse name: N/A     Number of children: N/A     Years of education: N/A     Social History Main Topics     Smoking status: Never Smoker     Smokeless tobacco: Never Used     Alcohol use No     Drug use: No     Sexual activity: Yes     Partners: Male     Other Topics Concern     None     Social History Narrative       Family History - No family history on file.    Review of Systems - As per HPI and PMHx, otherwise 10+ system review of the head and neck, and general constitution is negative.    Physical Exam  B/P: Data Unavailable, T: Data Unavailable, P: Data Unavailable, R: 12  Vitals: Resp 12  Ht 1.676 m (5' 6\")  Wt 56.7 kg (125 lb)  LMP 03/22/2017 (Exact Date)  BMI 20.18 kg/m2  BMI= Body mass index is 20.18 " kg/(m^2).    General - The patient is well nourished and well developed, and appears to have good nutritional status.  Alert and oriented to person and place, answers questions and cooperates with examination appropriately.   Head and Face - Normocephalic and atraumatic, with no gross asymmetry noted of the contour of the facial features.  The facial nerve is intact, with strong symmetric movements.  Voice and Breathing - The patient was breathing comfortably without the use of accessory muscles. There was no wheezing, stridor, or stertor.  The patients voice was clear and strong, and had appropriate pitch and quality.  Ears - The tympanic membranes are normal in appearance, bony landmarks are intact.  No retraction, perforation, or masses.  Normal mobility on valsalva maneuver, with no reports of dizziness on insuflation.  No fluid or purulence was seen in the external canal or the middle ear. No evidence of infection of the middle ear or external canal, cerumen was normal in appearance.  Eyes - Extraocular movements intact, and the pupils were reactive to light.  Sclera were not icteric or injected, conjunctiva were pink and moist.  Mouth - Examination of the oral cavity showed pink, healthy oral mucosa. No lesions or ulcerations noted.  The tongue was mobile and midline, and the dentition were in good condition.    Throat - The walls of the oropharynx were smooth, pink, moist, symmetric, and had no lesions or ulcerations.  The tonsillar pillars and soft palate were symmetric.  The uvula was midline on elevation.  The tonsils are only about 2+, but deeply scarred and cryptic with purulent tonsil stones bilaterally.  Neck - Normal midline excursion of the laryngotracheal complex during swallowing.  Full range of motion on passive movement.  Palpation of the occipital, submental, submandibular, internal jugular chain, and supraclavicular nodes did not demonstrate any abnormal lymph nodes or masses.  The carotid  pulse was palpable bilaterally.  Palpation of the thyroid was soft and smooth, with no nodules or goiter appreciated.  The trachea was mobile and midline.  Nose - External contour is symmetric, no gross deflection or scars.  Nasal mucosa is pink and moist with no abnormal mucus.  The septum was midline and non-obstructive, turbinates of normal size and position.  No polyps, masses, or purulence noted on examination.      A/P - Maria A Osuna is a 37 year old female  (J35.01) Chronic tonsillitis  (primary encounter diagnosis)  Comment:   Based on the physical exam and history, my recommendation is for tonsillectomy (with possible adenoidectomy).  The remainder of the visit was spent discussing the risks and benefits of tonsillectomy.  These included:  The risks of general anesthesia, bleeding, infection, possible need for emergency surgery to control bleeding, possible alteration of speech and swallowing, and even the possibility of continued throat problems following surgery.  They understood and wished to call in and schedule.

## 2017-04-25 NOTE — PATIENT INSTRUCTIONS
Scheduling Information  To schedule your CT/MRI scan, please contact [facility] at [phone] OR [facility] at [phone]    To schedule your Surgery, please contact our Specialty Schedulers at [phone]      ENT Clinic Locations Clinic Hours Telephone Number     [facility]  [address] [place] [address]   Monday:           1:00pm -- 5:00pm    Friday:              8:00am - 12:00pm   To schedule/reschedule an appointment with   Dr. Phillips,   please contact our   Specialty Scheduling Department at:     [phone]       [facility]  [address] [place] [address] Tuesday:          8:00am -- 2:00pm         Urgent Care Locations Clinic Hours Telephone Numbers     [facility]  [address] [place] [address]     Monday-Friday:     11:00am - 9:00pm    Saturday-Sunday:  9:00am - 5:00pm   [phone]     [facility]  [address] [place] [address]     Monday-Friday:      5:00pm - 9:00pm     Saturday-Sunday:  9:00am - 5:00pm   [phone]

## 2017-04-25 NOTE — NURSING NOTE
"Chief Complaint   Patient presents with     Consult     tonsils       Initial Resp 12  Ht 1.676 m (5' 6\")  Wt 56.7 kg (125 lb)  LMP 03/22/2017 (Exact Date)  BMI 20.18 kg/m2 Estimated body mass index is 20.18 kg/(m^2) as calculated from the following:    Height as of this encounter: 1.676 m (5' 6\").    Weight as of this encounter: 56.7 kg (125 lb).  Medication Reconciliation: complete     Zi Mayorga CMA      "

## 2017-06-26 ENCOUNTER — OFFICE VISIT (OUTPATIENT)
Dept: FAMILY MEDICINE | Facility: CLINIC | Age: 37
End: 2017-06-26
Payer: COMMERCIAL

## 2017-06-26 VITALS
HEIGHT: 67 IN | OXYGEN SATURATION: 100 % | BODY MASS INDEX: 20.25 KG/M2 | SYSTOLIC BLOOD PRESSURE: 107 MMHG | DIASTOLIC BLOOD PRESSURE: 74 MMHG | TEMPERATURE: 98.4 F | HEART RATE: 78 BPM | WEIGHT: 129 LBS

## 2017-06-26 DIAGNOSIS — J35.3 ENLARGED TONSILS AND ADENOIDS: ICD-10-CM

## 2017-06-26 DIAGNOSIS — Z01.818 PREOP GENERAL PHYSICAL EXAM: Primary | ICD-10-CM

## 2017-06-26 DIAGNOSIS — J35.01 CHRONIC TONSILLITIS: ICD-10-CM

## 2017-06-26 DIAGNOSIS — Z12.4 SCREENING FOR MALIGNANT NEOPLASM OF CERVIX: ICD-10-CM

## 2017-06-26 DIAGNOSIS — K58.2 IRRITABLE BOWEL SYNDROME WITH BOTH CONSTIPATION AND DIARRHEA: ICD-10-CM

## 2017-06-26 PROBLEM — Z71.89 ADVANCED DIRECTIVES, COUNSELING/DISCUSSION: Status: ACTIVE | Noted: 2017-06-26

## 2017-06-26 PROCEDURE — 99213 OFFICE O/P EST LOW 20 MIN: CPT | Performed by: FAMILY MEDICINE

## 2017-06-26 ASSESSMENT — PAIN SCALES - GENERAL: PAINLEVEL: NO PAIN (0)

## 2017-06-26 NOTE — MR AVS SNAPSHOT
After Visit Summary   6/26/2017    Maria A Osuna    MRN: [id number]           Patient Information     Date Of Birth          1980        Visit Information        Provider Department      6/26/2017 8:00 AM Sharon Tijerina MD [facility]        Today's Diagnoses     Preop general physical exam    -  1    Screening for malignant neoplasm of cervix        Enlarged tonsils and adenoids        Irritable bowel syndrome with both constipation and diarrhea        Chronic tonsillitis          Care Instructions    How to contact your care team: [phone] Pharmacy [phone]   BHAVNA MAHMOOD MD KATYA GEORGIEV, PA-C CHRIS JONES, PA-C NAM HO, MD JONATHAN BATES, MD ARVIN VOCAL, MD    Clinic hours M-Th 7am-7pm Fri 7am-5pm.   Urgent care M-F 11am-9pm  Sat/Sun 9am-5pm.   Pharmacy   Mon-Th:  8:00am-8pm   Fri:  8:00am-6:00pm  Sat/Sun  8:00am-5:00 pm       Before Your Surgery      Call your surgeon if there is any change in your health. This includes signs of a cold or flu (such as a sore throat, runny nose, cough, rash or fever).    Do not smoke, drink alcohol or take over the counter medicine (unless your surgeon or primary care doctor tells you to) for the 24 hours before and after surgery.    If you take prescribed drugs: Follow your doctor s orders about which medicines to take and which to stop until after surgery.    Eating and drinking prior to surgery: follow the instructions from your surgeon    Take a shower or bath the night before surgery. Use the soap your surgeon gave you to gently clean your skin. If you do not have soap from your surgeon, use your regular soap. Do not shave or scrub the surgery site.  Wear clean pajamas and have clean sheets on your bed.           Follow-ups after your visit        Follow-up notes from your care team     Return if symptoms worsen or fail to improve.      Your next 10 appointments already scheduled   "   Jul 06, 2017   Procedure with Luis F Phillips MD   [facility] (--)    [address] [place] [address] [phone]            Jul 18, 2017  8:00 AM CDT   Post Op with Luis F Phillips MD   [facility]    [address] [facility] [address] [phone]              Who to contact     If you have questions or need follow up information about today's clinic visit or your schedule please contact [facility] directly at [phone].  Normal or non-critical lab and imaging results will be communicated to you by MyChart, letter or phone within 4 business days after the clinic has received the results. If you do not hear from us within 7 days, please contact the clinic through [employer] or phone. If you have a critical or abnormal lab result, we will notify you by phone as soon as possible.  Submit refill requests through [employer] or call your pharmacy and they will forward the refill request to us. Please allow 3 business days for your refill to be completed.          Additional Information About Your Visit        [employer] Information     [employer] gives you secure access to your electronic health record. If you see a primary care provider, you can also send messages to your care team and make appointments. If you have questions, please call your primary care clinic.  If you do not have a primary care provider, please call [phone] and they will assist you.        Care EveryWhere ID     This is your Care EveryWhere ID. This could be used by other organizations to access your [place] medical records  [id number]        Your Vitals Were     Pulse Temperature Height Last Period Pulse Oximetry Breastfeeding?    78 98.4  F (36.9  C) (Oral) 5' 7\" (1.702 m) 05/26/2017 (Approximate) 100% No    BMI (Body Mass Index)                   20.2 kg/m2            Blood Pressure from Last 3 Encounters: "   06/26/17 107/74   04/11/17 114/77   03/27/17 123/78    Weight from Last 3 Encounters:   06/26/17 129 lb (58.5 kg)   04/25/17 125 lb (56.7 kg)   04/11/17 127 lb (57.6 kg)              Today, you had the following     No orders found for display       Primary Care Provider Office Phone # Fax #    Sharon Edwina Tijerina [phone]       [facility] [address]  [facility] [address]        Equal Access to Services     [facility] CenterRICARDO : Hadii aad ku hadasho Soomaali, waaxda luqadaha, qaybta kaalmada adeegyada, cristina peña . So [facility] [phone].    ATENCIÓN: Si habla español, tiene a beckman disposición servicios gratuitos de asistencia lingüística. Llame al [phone].    We comply with applicable federal civil rights laws and [place] laws. We do not discriminate on the basis of race, color, national origin, age, disability sex, sexual orientation or gender identity.            Thank you!     Thank you for choosing [facility]  for your care. Our goal is always to provide you with excellent care. Hearing back from our patients is one way we can continue to improve our services. Please take a few minutes to complete the written survey that you may receive in the mail after your visit with us. Thank you!             Your Updated Medication List - Protect others around you: Learn how to safely use, store and throw away your medicines at www.disposemymeds.org.      Notice  As of 6/26/2017  8:25 AM    You have not been prescribed any medications.

## 2017-06-26 NOTE — PATIENT INSTRUCTIONS
How to contact your care team: [phone] Pharmacy [phone]   BHAVNA MAHMOOD MD KATYA GEORGIEV, PA-C CHRIS JONES, PA-C NAM HO, MD JONATHAN BATES, MD ARVIN VOCAL, MD    Clinic hours M-Th 7am-7pm Fri 7am-5pm.   Urgent care M-F 11am-9pm  Sat/Sun 9am-5pm.   Pharmacy   Mon-Th:  8:00am-8pm   Fri:  8:00am-6:00pm  Sat/Sun  8:00am-5:00 pm       Before Your Surgery      Call your surgeon if there is any change in your health. This includes signs of a cold or flu (such as a sore throat, runny nose, cough, rash or fever).    Do not smoke, drink alcohol or take over the counter medicine (unless your surgeon or primary care doctor tells you to) for the 24 hours before and after surgery.    If you take prescribed drugs: Follow your doctor s orders about which medicines to take and which to stop until after surgery.    Eating and drinking prior to surgery: follow the instructions from your surgeon    Take a shower or bath the night before surgery. Use the soap your surgeon gave you to gently clean your skin. If you do not have soap from your surgeon, use your regular soap. Do not shave or scrub the surgery site.  Wear clean pajamas and have clean sheets on your bed.

## 2017-06-26 NOTE — PROGRESS NOTES
[address] [address]  [phone]  Dept: [phone]    PRE-OP EVALUATION:  Today's date: 2017    Maria A Osuna (: 1980) presents for pre-operative evaluation assessment as requested by Dr. Luis F Phillips.  She requires evaluation and anesthesia risk assessment prior to undergoing surgery/procedure for treatment of Chronic tonsillitis .  Proposed procedure: Bilateral Tonsillectomy and Possible Adenoidectomy    Date of Surgery/ Procedure: 17  Time of Surgery/ Procedure: 8:30am  Hospital/Surgical Facility: [facility]  Primary Physician: Sharon Tijerina  Type of Anesthesia Anticipated: General    Patient has a Health Care Directive or Living Will:  NO - Patient declines healthcare directive packet    1. NO - Do you have a history of heart attack, stroke, stent, bypass or surgery on an artery in the head, neck, heart or legs?  2. NO - Do you ever have any pain or discomfort in your chest?  3. NO - Do you have a history of  Heart Failure?  4. NO - Are you troubled by shortness of breath when: walking on the level, up a slight hill or at night?  5. NO - Do you currently have a cold, bronchitis or other respiratory infection?  6. NO - Do you have a cough, shortness of breath or wheezing?  7. NO - Do you sometimes get pains in the calves of your legs when you walk?  8. NO - Do you or anyone in your family have previous history of blood clots?  9. NO - Do you or does anyone in your family have a serious bleeding problem such as prolonged bleeding following surgeries or cuts?  10. NO - Have you ever had problems with anemia or been told to take iron pills?  11. NO - Have you had any abnormal blood loss such as black, tarry or bloody stools, or abnormal vaginal bleeding?  12. NO - Have you ever had a blood transfusion?  13. NO - Have you or any of your relatives ever had problems with anesthesia? Emesis after  "wisdom teeth extraction when at home post anesthesia. Taking Vicodin at the time. 18 years old  14. NO - Do you have sleep apnea, excessive snoring or daytime drowsiness?  15. NO - Do you have any prosthetic heart valves?  16. NO - Do you have prosthetic joints?  17. NO - Is there any chance that you may be pregnant?      HPI:                                                      Brief HPI related to upcoming procedure: Enlarged tonsils post influenza infection with persistent bad taste and irritation.       See problem list for active medical problems.  Problems all longstanding and stable, except as noted/documented.  See ROS for pertinent symptoms related to these conditions.                                                                                                  .    MEDICAL HISTORY:                                                      Patient Active Problem List    Diagnosis Date Noted     Chronic tonsillitis 04/25/2017     Priority: Medium     Irritable bowel syndrome 05/12/2015     Priority: Medium      Past Medical History:   Diagnosis Date     Irritable bowel syndrome 5/12/2015     Past Surgical History:   Procedure Laterality Date     wisdom teeth[  2000     No current outpatient prescriptions on file.     OTC products: None, except as noted above    No Known Allergies   Latex Allergy: NO    Social History   Substance Use Topics     Smoking status: Never Smoker     Smokeless tobacco: Never Used     Alcohol use No     History   Drug Use No       REVIEW OF SYSTEMS:                                                    Constitutional, neuro, ENT, endocrine, pulmonary, cardiac, gastrointestinal, genitourinary, musculoskeletal, integument and psychiatric systems are negative, except as otherwise noted.    EXAM:                                                    /74 (BP Location: Right arm, Patient Position: Chair, Cuff Size: Adult Regular)  Pulse 78  Temp 98.4  F (36.9  C) (Oral)  Ht 5' 7\" (1.702 " m)  Wt 129 lb (58.5 kg)  LMP 05/26/2017 (Approximate)  SpO2 100%  Breastfeeding? No  BMI 20.2 kg/m2    GENERAL APPEARANCE: healthy, alert and no distress     EYES: EOMI, PERRL     HENT: ear canals and TM's normal and nose and mouth without ulcers or lesions     NECK: no adenopathy, no asymmetry, masses, or scars and thyroid normal to palpation     RESP: lungs clear to auscultation - no rales, rhonchi or wheezes     CV: regular rates and rhythm, normal S1 S2, no S3 or S4 and no murmur, click or rub     ABDOMEN:  soft, nontender, no HSM or masses and bowel sounds normal     MS: extremities normal- no gross deformities noted, no evidence of inflammation in joints, FROM in all extremities.     SKIN: no suspicious lesions or rashes     NEURO: Normal strength and tone, sensory exam grossly normal, mentation intact and speech normal     PSYCH: mentation appears normal. and affect normal/bright     LYMPHATICS: No axillary, cervical, or supraclavicular nodes    DIAGNOSTICS:                                                    No labs or EKG required for low risk surgery (cataract, skin procedure, breast biopsy, etc)    Recent Labs   Lab Test  05/12/15   0953  08/07/14   0828  08/03/14   1155   HGB  12.7  9.8*  10.5*   PLT  253   --   261   NA   --    --   136   POTASSIUM   --    --   3.0*   CR   --    --   0.58        IMPRESSION:                                                    Reason for surgery/procedure: Tonsil enlargement and possible adenoid enlargement/ tonsillectomy and adenoidectomy.  Diagnosis/reason for consult: cardiac and anesthesia risk assessment     The proposed surgical procedure is considered INTERMEDIATE risk.    REVISED CARDIAC RISK INDEX  The patient has the following serious cardiovascular risks for perioperative complications such as (MI, PE, VFib and 3  AV Block):  No serious cardiac risks  INTERPRETATION: 0 risks: Class I (very low risk - 0.4% complication rate)    The patient has the following  additional risks for perioperative complications:  No identified additional risks      ICD-10-CM    1. Preop general physical exam Z01.818 Approved surgery and anesthesia   2. Screening for malignant neoplasm of cervix Z12.4 Will have this done at obstetrics and gynecology          4. Enlarged tonsils and adenoids J35.3 Tonsillectomy and possible adenoidectomy   5. Irritable bowel syndrome with both constipation and diarrhea K58.2 Use Miralax with narcotic pain medication as needed to avoid constipation.    6. Chronic tonsillitis J35.01 As above       RECOMMENDATIONS:                                                              APPROVAL GIVEN to proceed with proposed procedure, without further diagnostic evaluation       Signed Electronically by: Sharon Tijerina MD    Copy of this evaluation report is provided to requesting physician.    [facility] Preop Guidelines

## 2017-06-26 NOTE — NURSING NOTE
"Chief Complaint   Patient presents with     Pre-Op Exam     Fasting       Initial /74 (BP Location: Right arm, Patient Position: Chair, Cuff Size: Adult Regular)  Pulse 78  Temp 98.4  F (36.9  C) (Oral)  Ht 5' 7\" (1.702 m)  Wt 129 lb (58.5 kg)  LMP 05/26/2017 (Approximate)  SpO2 100%  Breastfeeding? No  BMI 20.2 kg/m2 Estimated body mass index is 20.2 kg/(m^2) as calculated from the following:    Height as of this encounter: 5' 7\" (1.702 m).    Weight as of this encounter: 129 lb (58.5 kg).  Medication Reconciliation: complete     Saul Garcia CMA    "

## 2017-07-05 ENCOUNTER — ANESTHESIA EVENT (OUTPATIENT)
Dept: SURGERY | Facility: AMBULATORY SURGERY CENTER | Age: 37
End: 2017-07-05

## 2017-07-06 ENCOUNTER — ANESTHESIA (OUTPATIENT)
Dept: SURGERY | Facility: AMBULATORY SURGERY CENTER | Age: 37
End: 2017-07-06

## 2017-07-06 ENCOUNTER — SURGERY (OUTPATIENT)
Age: 37
End: 2017-07-06

## 2017-07-06 ENCOUNTER — HOSPITAL ENCOUNTER (OUTPATIENT)
Facility: AMBULATORY SURGERY CENTER | Age: 37
Discharge: HOME | End: 2017-07-06
Attending: OTOLARYNGOLOGY | Admitting: OTOLARYNGOLOGY
Payer: COMMERCIAL

## 2017-07-06 VITALS
OXYGEN SATURATION: 99 % | DIASTOLIC BLOOD PRESSURE: 82 MMHG | TEMPERATURE: 97.6 F | SYSTOLIC BLOOD PRESSURE: 116 MMHG | RESPIRATION RATE: 16 BRPM

## 2017-07-06 DIAGNOSIS — J35.01 TONSILLITIS, CHRONIC: Primary | ICD-10-CM

## 2017-07-06 LAB — BETA HCG QUAL IFA URINE: NEGATIVE

## 2017-07-06 PROCEDURE — 42826 REMOVAL OF TONSILS: CPT

## 2017-07-06 PROCEDURE — G8907 PT DOC NO EVENTS ON DISCHARG: HCPCS

## 2017-07-06 PROCEDURE — 88304 TISSUE EXAM BY PATHOLOGIST: CPT | Performed by: OTOLARYNGOLOGY

## 2017-07-06 PROCEDURE — 42826 REMOVAL OF TONSILS: CPT | Performed by: OTOLARYNGOLOGY

## 2017-07-06 PROCEDURE — G8916 PT W IV AB GIVEN ON TIME: HCPCS

## 2017-07-06 PROCEDURE — 84703 CHORIONIC GONADOTROPIN ASSAY: CPT | Performed by: ANESTHESIOLOGY

## 2017-07-06 RX ORDER — DEXAMETHASONE SODIUM PHOSPHATE 10 MG/ML
10 INJECTION, SOLUTION INTRAMUSCULAR; INTRAVENOUS ONCE
Status: COMPLETED | OUTPATIENT
Start: 2017-07-06 | End: 2017-07-06

## 2017-07-06 RX ORDER — FENTANYL CITRATE 50 UG/ML
INJECTION, SOLUTION INTRAMUSCULAR; INTRAVENOUS PRN
Status: DISCONTINUED | OUTPATIENT
Start: 2017-07-06 | End: 2017-07-06

## 2017-07-06 RX ORDER — OXYCODONE HYDROCHLORIDE 5 MG/1
5 TABLET ORAL EVERY 4 HOURS PRN
Qty: 80 TABLET | Refills: 0 | Status: SHIPPED | OUTPATIENT
Start: 2017-07-06 | End: 2019-11-09

## 2017-07-06 RX ORDER — OXYCODONE HYDROCHLORIDE 5 MG/1
5 TABLET ORAL ONCE
Status: COMPLETED | OUTPATIENT
Start: 2017-07-06 | End: 2017-07-06

## 2017-07-06 RX ORDER — SODIUM CHLORIDE, SODIUM LACTATE, POTASSIUM CHLORIDE, CALCIUM CHLORIDE 600; 310; 30; 20 MG/100ML; MG/100ML; MG/100ML; MG/100ML
INJECTION, SOLUTION INTRAVENOUS CONTINUOUS
Status: DISCONTINUED | OUTPATIENT
Start: 2017-07-06 | End: 2017-07-07 | Stop reason: HOSPADM

## 2017-07-06 RX ORDER — ONDANSETRON 8 MG/1
8 TABLET, FILM COATED ORAL EVERY 8 HOURS PRN
Qty: 15 TABLET | Refills: 1 | Status: SHIPPED | OUTPATIENT
Start: 2017-07-06 | End: 2019-11-09

## 2017-07-06 RX ORDER — ACETAMINOPHEN 325 MG/1
975 TABLET ORAL ONCE
Status: COMPLETED | OUTPATIENT
Start: 2017-07-06 | End: 2017-07-06

## 2017-07-06 RX ORDER — LIDOCAINE HYDROCHLORIDE 20 MG/ML
INJECTION, SOLUTION INFILTRATION; PERINEURAL PRN
Status: DISCONTINUED | OUTPATIENT
Start: 2017-07-06 | End: 2017-07-06

## 2017-07-06 RX ORDER — NALOXONE HYDROCHLORIDE 0.4 MG/ML
.1-.4 INJECTION, SOLUTION INTRAMUSCULAR; INTRAVENOUS; SUBCUTANEOUS
Status: DISCONTINUED | OUTPATIENT
Start: 2017-07-06 | End: 2017-07-07 | Stop reason: HOSPADM

## 2017-07-06 RX ORDER — PROPOFOL 10 MG/ML
INJECTION, EMULSION INTRAVENOUS CONTINUOUS PRN
Status: DISCONTINUED | OUTPATIENT
Start: 2017-07-06 | End: 2017-07-06

## 2017-07-06 RX ORDER — LIDOCAINE 40 MG/G
CREAM TOPICAL
Status: DISCONTINUED | OUTPATIENT
Start: 2017-07-06 | End: 2017-07-07 | Stop reason: HOSPADM

## 2017-07-06 RX ORDER — ONDANSETRON 2 MG/ML
4 INJECTION INTRAMUSCULAR; INTRAVENOUS EVERY 30 MIN PRN
Status: DISCONTINUED | OUTPATIENT
Start: 2017-07-06 | End: 2017-07-07 | Stop reason: HOSPADM

## 2017-07-06 RX ORDER — CEFAZOLIN SODIUM 2 G/100ML
2 INJECTION, SOLUTION INTRAVENOUS
Status: COMPLETED | OUTPATIENT
Start: 2017-07-06 | End: 2017-07-06

## 2017-07-06 RX ORDER — ONDANSETRON 4 MG/1
4 TABLET, ORALLY DISINTEGRATING ORAL EVERY 30 MIN PRN
Status: DISCONTINUED | OUTPATIENT
Start: 2017-07-06 | End: 2017-07-07 | Stop reason: HOSPADM

## 2017-07-06 RX ORDER — FENTANYL CITRATE 50 UG/ML
25-50 INJECTION, SOLUTION INTRAMUSCULAR; INTRAVENOUS EVERY 5 MIN PRN
Status: DISCONTINUED | OUTPATIENT
Start: 2017-07-06 | End: 2017-07-07 | Stop reason: HOSPADM

## 2017-07-06 RX ORDER — MEPERIDINE HYDROCHLORIDE 25 MG/ML
12.5 INJECTION INTRAMUSCULAR; INTRAVENOUS; SUBCUTANEOUS
Status: DISCONTINUED | OUTPATIENT
Start: 2017-07-06 | End: 2017-07-07 | Stop reason: HOSPADM

## 2017-07-06 RX ORDER — ONDANSETRON 2 MG/ML
INJECTION INTRAMUSCULAR; INTRAVENOUS PRN
Status: DISCONTINUED | OUTPATIENT
Start: 2017-07-06 | End: 2017-07-06

## 2017-07-06 RX ORDER — LIDOCAINE HYDROCHLORIDE AND EPINEPHRINE 10; 10 MG/ML; UG/ML
INJECTION, SOLUTION INFILTRATION; PERINEURAL PRN
Status: DISCONTINUED | OUTPATIENT
Start: 2017-07-06 | End: 2017-07-06 | Stop reason: HOSPADM

## 2017-07-06 RX ORDER — GABAPENTIN 300 MG/1
300 CAPSULE ORAL ONCE
Status: COMPLETED | OUTPATIENT
Start: 2017-07-06 | End: 2017-07-06

## 2017-07-06 RX ORDER — PROPOFOL 10 MG/ML
INJECTION, EMULSION INTRAVENOUS PRN
Status: DISCONTINUED | OUTPATIENT
Start: 2017-07-06 | End: 2017-07-06

## 2017-07-06 RX ADMIN — CEFAZOLIN SODIUM 2 G: 2 INJECTION, SOLUTION INTRAVENOUS at 08:35

## 2017-07-06 RX ADMIN — SODIUM CHLORIDE, SODIUM LACTATE, POTASSIUM CHLORIDE, CALCIUM CHLORIDE: 600; 310; 30; 20 INJECTION, SOLUTION INTRAVENOUS at 07:53

## 2017-07-06 RX ADMIN — DEXAMETHASONE SODIUM PHOSPHATE 10 MG: 10 INJECTION, SOLUTION INTRAMUSCULAR; INTRAVENOUS at 08:35

## 2017-07-06 RX ADMIN — PROPOFOL 200 MCG/KG/MIN: 10 INJECTION, EMULSION INTRAVENOUS at 08:32

## 2017-07-06 RX ADMIN — ONDANSETRON 4 MG: 2 INJECTION INTRAMUSCULAR; INTRAVENOUS at 08:37

## 2017-07-06 RX ADMIN — FENTANYL CITRATE 50 MCG: 50 INJECTION, SOLUTION INTRAMUSCULAR; INTRAVENOUS at 08:32

## 2017-07-06 RX ADMIN — LIDOCAINE HYDROCHLORIDE 40 MG: 20 INJECTION, SOLUTION INFILTRATION; PERINEURAL at 08:31

## 2017-07-06 RX ADMIN — ACETAMINOPHEN 975 MG: 325 TABLET ORAL at 07:48

## 2017-07-06 RX ADMIN — SODIUM CHLORIDE, SODIUM LACTATE, POTASSIUM CHLORIDE, CALCIUM CHLORIDE: 600; 310; 30; 20 INJECTION, SOLUTION INTRAVENOUS at 08:28

## 2017-07-06 RX ADMIN — LIDOCAINE HYDROCHLORIDE AND EPINEPHRINE 4 ML: 10; 10 INJECTION, SOLUTION INFILTRATION; PERINEURAL at 08:40

## 2017-07-06 RX ADMIN — PROPOFOL 140 MG: 10 INJECTION, EMULSION INTRAVENOUS at 08:31

## 2017-07-06 RX ADMIN — GABAPENTIN 300 MG: 300 CAPSULE ORAL at 07:48

## 2017-07-06 RX ADMIN — OXYCODONE HYDROCHLORIDE 5 MG: 5 TABLET ORAL at 09:36

## 2017-07-06 ASSESSMENT — LIFESTYLE VARIABLES: TOBACCO_USE: 0

## 2017-07-06 NOTE — IP AVS SNAPSHOT
MRN:[id number]                      After Visit Summary   7/6/2017    Maria A Osuna    MRN: [id number]           Thank you!     Thank you for choosing [facility] for your care. Our goal is always to provide you with excellent care. Hearing back from our patients is one way we can continue to improve our services. Please take a few minutes to complete the written survey that you may receive in the mail after you visit with us. Thank you!        Patient Information     Date Of Birth          1980        About your hospital stay     You were admitted on:  July 6, 2017 You [place] care in the[place]  [facility]    You were discharged on:  July 6, 2017       Who to Call     For medical emergencies, please call 911.  For non-urgent questions about your medical care, please call your primary care provider or clinic, [phone]  For questions related to your surgery, please call your surgery clinic        Attending Provider     Provider Specialty    Luis F Phillips MD Otolaryngology       Primary Care Provider Office Phone # Fax #    Sharon Edwina Tijerina [phone]      Your next 10 appointments already scheduled     Jul 18, 2017  8:00 AM CDT   Post Op with Luis F Phillips MD   [facility]    [address] [place] [id number] [phone]              Further instructions from your care team       [facility]  Same-Day Surgery   Adult Discharge Orders & Instructions   For 24 hours after surgery  1. Get plenty of rest.  A responsible adult must stay with you for at least 24 hours after you leave the hospital.   2. Do not drive or use heavy equipment.  If you have weakness or tingling, don't drive or use heavy equipment until this feeling goes away.  3. Do not drink alcohol.  4. Avoid strenuous or risky activities.  Ask for help when climbing stairs.    5. You may feel lightheaded.  IF so, sit for a few minutes before standing.  Have someone help you get up.   6. If you have nausea (feel sick to your stomach): Drink only clear liquids such as apple juice, ginger ale, broth or 7-Up.  Rest may also help.  Be sure to drink enough fluids.  Move to a regular diet as you feel able.  7. You may have a slight fever. Call the doctor if your fever is over 100 F (37.7 C) (taken under the tongue) or lasts longer than 24 hours.  8. You may have a dry mouth, a sore throat, muscle aches or trouble sleeping.  These should go away after 24 hours.  9. Do not make important or legal decisions.   Call your doctor for any of the followin.  Signs of infection (fever, growing tenderness at the surgery site, a large amount of drainage or bleeding, severe pain, foul-smelling drainage, redness, swelling).    2. It has been over 8 to 10 hours since surgery and you are still not able to urinate (pass water).    3.  Headache for over 24 hours.    4.  Numbness, tingling or weakness the day after surgery (if you had spinal anesthesia).  To contact a doctor, call __[phone]_________________________     Postoperative Care for Tonsillectomy (with or without adenoidectomy)    Recovery - There are a handful of issues that routinely occur during recover that should be anticipated during your recovery.    1. The pain and swelling almost always gets worse before it gets better, this is normal.  Usually it peaks 3 to 5 days after the surgery, and then begins improving at 7 to 8 days after surgery.  Of course, this is variable from person to person.  2. The only dietary restriction is avoidance of hard or crunchy things until I see you in follow up.  If it makes a noise when you bite it, it is too hard.  Although it is good to begin eating again from day one, it is not unusual to not eat for several days after the procedure.  The most important thing is staying hydrated.  Drink fluids with  electrolytes if possible, such as sports drinks.  3. The pain medication you were sent home with can make some people very nauseated.  To minimize this, avoid taking it on an empty stomach, or take smaller does with greater frequency.  For example if your dose is 2 teaspoons every four hours, try taking one teaspoon every two hours, etc.  4. Antibiotic are sometimes given after surgery, not to prevent infection, but some research shows that it helps to decrease pain.  This is not absolutely proven, and therefore is not absolutely necessary.   5. Try to stay ahead of the pain.  In other words, do not wait for pain medication to completely wear off before taking more pain medicine.  Instead, take the medication every 4 to 6 hours, even if it requires setting an alarm clock at night.  This is especially helpful during the first 5 days.  6. The uvula ( the small hanging object in the back of your mouth) frequently swells up after tonsillectomy, but will go back to normal.  This swelling can temporarily cause the sensation of something being stuck in your throat, it will go away with recovery.  Also, because of the arrangement of nerves under where the tonsils were, sharp ear pain is very common during recovery, and will also go away with recovery.   7. With adenoidectomy, a very strong and foul-smelling odor can occur about 4-7 days after surgery.  This fades rapidly, and unless there is an associated fever no antibiotics are necessary.  8. It is very common after tonsillectomy to experience ear pain. This is due to nerves on the side of the throat becoming inflamed, and causing the perception of sudden episodes of ear pain.  This can be controlled with the same pain medication given for the surgery.     Activity - Avoid heavy lifting (greater than 20 pounds), strenuous exercise, or extremely cold environments until the follow up appointment.  Also, try to sleep with your head elevated.  An irritated cough from the  breathing tube is fairly normal after surgery.    Medications - Except blood thinners, almost all medication can be re-started after tonsillectomy.      Complications - Bleeding is by far the most common complication after tonsillectomy.  If there are a few small drops or streaks of blood in the saliva that then goes away, this can be conservatively watched.  Gentle gargling with the ice water can also help stop this minor bleeding.  However, if the bleeding is persistent, or heavy bleeding occurs, do not hesitate.  Go to the emergency room to be evaluated.    Follow up - I like to see my patients about 2 weeks after the procedure to make sure that everything is healing appropriately.  Occasionally, there can be some longer - lasting side effects of surgery such as abnormal tongue sensations, or unusual swallowing.  However, if everything is healing well, the 2 week postoperative visit is all that will be necessary.    If there are any questions or issues with the above, or if there are other issues that concern you, always feel free to call the clinic and I am happy to speak with you as soon as I can.    Sunday Phillips MD   Otolaryngology  [facility]  [phone] After hours, [facility] option      May Eat Should not eat   - Soft bread  - Soggy waffles or   Latvian toast (no crusts).  Soaked in syrup  - Pancakes  - Scrambled or   poached egg   - Toast  - Crispy waffles  - Fried foods   - Oatmeal,or   Creamy cereal  - Soggy cold cereal  (soaked in milk   - Crunchy cold   cereal   - Soup  - Hot dogs  - Hamburgers  - Tender, moist  meat  - Pasta, noodles  - Spaghetti-Os  - Macaroni and  Cheese   - Tough, dry meat,  chicken or fish   - Milk  - Custard, pudding  - Ice cream  - Malts, shakes  - Yogurt (smooth)  - Cottage cheese   - Cookies  - Crackers  - Pretzels  - Chips  - Popcorn  - Nuts   - Sandwiches, (no crusts)  - Smooth peanut butter   and jelly  - Processed cheese  - Tuna - Grilled  cheese  sandwiches   - Cooked vegetables  - Mashed potatoes - Raw vegetables   - Tomatoes   - Applesauce  - Bananas  - Canned fruits  - Watermelon with out  seeds - Citrus fruits  - Moist fresh fruits   - Juices (not citrus)  - Calos aid  - Flat pop (no bubbles)  - Jell-O - Citrus juices  - Pop with bubbles           Pending Results     No orders found from 7/4/2017 to 7/7/2017.            Admission Information     Date & Time Provider Department Dept. Phone    7/6/2017 Luis F Phillips MD [facility] [phone]      Your Vitals Were     Blood Pressure Temperature Respirations Last Period Pulse Oximetry       116/71 97.6  F (36.4  C) (Temporal) 16 07/05/2017 98%       MyChart Information     [facility] gives you secure access to your electronic health record. If you see a primary care provider, you can also send messages to your care team and make appointments. If you have questions, please call your primary care clinic.  If you do not have a primary care provider, please call [phone] and they will assist you.        Care EveryWhere ID     This is your Care EveryWhere ID. This could be used by other organizations to access your [facility] medical records  [id number]        Equal Access to Services     ARNOLD RIBEIRO AH: Kaden solares Soaislinn, waaxda lugabbyadaha, qaybta kaalmada adesydyada, cristina jessica. So [facility] [phone].    ATENCIÓN: Si habla español, tiene a beckman disposición servicios gratuitos de asistencia lingüística. Llame al [phone].    We comply with applicable federal civil rights laws and [place] laws. We do not discriminate on the basis of race, color, national origin, age, disability sex, sexual orientation or gender identity.               Review of your medicines      START taking        Dose / Directions    ondansetron 8 MG tablet   Commonly known as:  ZOFRAN   Used for:  Tonsillitis, chronic        Dose:  8 mg   Take 1 tablet (8 mg) by  mouth every 8 hours as needed for nausea   Quantity:  15 tablet   Refills:  1       oxyCODONE 5 MG IR tablet   Commonly known as:  ROXICODONE   Used for:  Tonsillitis, chronic        Dose:  5 mg   Take 1 tablet (5 mg) by mouth every 4 hours as needed for pain maximum 6 tablet(s) per day   Quantity:  80 tablet   Refills:  0            Where to get your medicines      Some of these will need a paper prescription and others can be bought over the counter. Ask your nurse if you have questions.     Bring a paper prescription for each of these medications     ondansetron 8 MG tablet    oxyCODONE 5 MG IR tablet                Protect others around you: Learn how to safely use, store and throw away your medicines at www.disposemymeds.org.             Medication List: This is a list of all your medications and when to take them. Check marks below indicate your daily home schedule. Keep this list as a reference.      Medications           Morning Afternoon Evening Bedtime As Needed    ondansetron 8 MG tablet   Commonly known as:  ZOFRAN   Take 1 tablet (8 mg) by mouth every 8 hours as needed for nausea                                oxyCODONE 5 MG IR tablet   Commonly known as:  ROXICODONE   Take 1 tablet (5 mg) by mouth every 4 hours as needed for pain maximum 6 tablet(s) per day   Last time this was given:  5 mg on 7/6/2017  9:36 AM

## 2017-07-06 NOTE — DISCHARGE INSTRUCTIONS
[facility]  Same-Day Surgery   Adult Discharge Orders & Instructions   For 24 hours after surgery  1. Get plenty of rest.  A responsible adult must stay with you for at least 24 hours after you leave the hospital.   2. Do not drive or use heavy equipment.  If you have weakness or tingling, don't drive or use heavy equipment until this feeling goes away.  3. Do not drink alcohol.  4. Avoid strenuous or risky activities.  Ask for help when climbing stairs.   5. You may feel lightheaded.  IF so, sit for a few minutes before standing.  Have someone help you get up.   6. If you have nausea (feel sick to your stomach): Drink only clear liquids such as apple juice, ginger ale, broth or 7-Up.  Rest may also help.  Be sure to drink enough fluids.  Move to a regular diet as you feel able.  7. You may have a slight fever. Call the doctor if your fever is over 100 F (37.7 C) (taken under the tongue) or lasts longer than 24 hours.  8. You may have a dry mouth, a sore throat, muscle aches or trouble sleeping.  These should go away after 24 hours.  9. Do not make important or legal decisions.   Call your doctor for any of the followin.  Signs of infection (fever, growing tenderness at the surgery site, a large amount of drainage or bleeding, severe pain, foul-smelling drainage, redness, swelling).    2. It has been over 8 to 10 hours since surgery and you are still not able to urinate (pass water).    3.  Headache for over 24 hours.    4.  Numbness, tingling or weakness the day after surgery (if you had spinal anesthesia).  To contact a doctor, call __[phone]_________________________     Postoperative Care for Tonsillectomy (with or without adenoidectomy)    Recovery - There are a handful of issues that routinely occur during recover that should be anticipated during your recovery.    1. The pain and swelling almost always gets worse before it gets better, this is normal.  Usually it peaks 3 to 5  days after the surgery, and then begins improving at 7 to 8 days after surgery.  Of course, this is variable from person to person.  2. The only dietary restriction is avoidance of hard or crunchy things until I see you in follow up.  If it makes a noise when you bite it, it is too hard.  Although it is good to begin eating again from day one, it is not unusual to not eat for several days after the procedure.  The most important thing is staying hydrated.  Drink fluids with electrolytes if possible, such as sports drinks.  3. The pain medication you were sent home with can make some people very nauseated.  To minimize this, avoid taking it on an empty stomach, or take smaller does with greater frequency.  For example if your dose is 2 teaspoons every four hours, try taking one teaspoon every two hours, etc.  4. Antibiotic are sometimes given after surgery, not to prevent infection, but some research shows that it helps to decrease pain.  This is not absolutely proven, and therefore is not absolutely necessary.   5. Try to stay ahead of the pain.  In other words, do not wait for pain medication to completely wear off before taking more pain medicine.  Instead, take the medication every 4 to 6 hours, even if it requires setting an alarm clock at night.  This is especially helpful during the first 5 days.  6. The uvula ( the small hanging object in the back of your mouth) frequently swells up after tonsillectomy, but will go back to normal.  This swelling can temporarily cause the sensation of something being stuck in your throat, it will go away with recovery.  Also, because of the arrangement of nerves under where the tonsils were, sharp ear pain is very common during recovery, and will also go away with recovery.   7. With adenoidectomy, a very strong and foul-smelling odor can occur about 4-7 days after surgery.  This fades rapidly, and unless there is an associated fever no antibiotics are necessary.  8. It is  very common after tonsillectomy to experience ear pain. This is due to nerves on the side of the throat becoming inflamed, and causing the perception of sudden episodes of ear pain.  This can be controlled with the same pain medication given for the surgery.     Activity - Avoid heavy lifting (greater than 20 pounds), strenuous exercise, or extremely cold environments until the follow up appointment.  Also, try to sleep with your head elevated.  An irritated cough from the breathing tube is fairly normal after surgery.    Medications - Except blood thinners, almost all medication can be re-started after tonsillectomy.      Complications - Bleeding is by far the most common complication after tonsillectomy.  If there are a few small drops or streaks of blood in the saliva that then goes away, this can be conservatively watched.  Gentle gargling with the ice water can also help stop this minor bleeding.  However, if the bleeding is persistent, or heavy bleeding occurs, do not hesitate.  Go to the emergency room to be evaluated.    Follow up - I like to see my patients about 2 weeks after the procedure to make sure that everything is healing appropriately.  Occasionally, there can be some longer - lasting side effects of surgery such as abnormal tongue sensations, or unusual swallowing.  However, if everything is healing well, the 2 week postoperative visit is all that will be necessary.    If there are any questions or issues with the above, or if there are other issues that concern you, always feel free to call the clinic and I am happy to speak with you as soon as I can.    Sunday Phillips MD   Otolaryngology  [facility]  [phone] After hours, [facility] option      May Eat Should not eat   - Soft bread  - Soggy waffles or   Japanese toast (no crusts).  Soaked in syrup  - Pancakes  - Scrambled or   poached egg   - Toast  - Crispy waffles  - Fried foods   - Oatmeal,or   Creamy cereal  - Soggy  cold cereal  (soaked in milk   - Crunchy cold   cereal   - Soup  - Hot dogs  - Hamburgers  - Tender, moist  meat  - Pasta, noodles  - Spaghetti-Os  - Macaroni and  Cheese   - Tough, dry meat,  chicken or fish   - Milk  - Custard, pudding  - Ice cream  - Malts, shakes  - Yogurt (smooth)  - Cottage cheese   - Cookies  - Crackers  - Pretzels  - Chips  - Popcorn  - Nuts   - Sandwiches, (no crusts)  - Smooth peanut butter   and jelly  - Processed cheese  - Tuna - Grilled cheese  sandwiches   - Cooked vegetables  - Mashed potatoes - Raw vegetables   - Tomatoes   - Applesauce  - Bananas  - Canned fruits  - Watermelon with out  seeds - Citrus fruits  - Moist fresh fruits   - Juices (not citrus)  - Calos aid  - Flat pop (no bubbles)  - Jell-O - Citrus juices  - Pop with bubbles

## 2017-07-06 NOTE — ANESTHESIA CARE TRANSFER NOTE
Patient: Maria A Osuna    Procedure(s):  Tonsillectomy  - Wound Class: II-Clean Contaminated    Diagnosis: chronic tonsillitis  Diagnosis Additional Information: No value filed.    Anesthesia Type:   General, ETT     Note:  Airway :Face Mask  Patient transferred to:PACU  Comments: To PACU, awake, sleepy, exchanging well, sats 99%, Report to RN.      Vitals: (Last set prior to Anesthesia Care Transfer)    CRNA VITALS  7/6/2017 0842 - 7/6/2017 0916      7/6/2017             Pulse: 102    SpO2: 99 %    Resp Rate (observed): 14                Electronically Signed By: EVELIA Sanchez CRNA  July 6, 2017  9:16 AM

## 2017-07-06 NOTE — IP AVS SNAPSHOT
[facility]    [address] [place] [address]    Phone:  [phone]                                       After Visit Summary   7/6/2017    Maria A Osuna    MRN: [id number]           After Visit Summary Signature Page     I have received my discharge instructions, and my questions have been answered. I have discussed any challenges I see with this plan with the nurse or doctor.    ..........................................................................................................................................  Patient/Patient Representative Signature      ..........................................................................................................................................  Patient Representative Print Name and Relationship to Patient    ..................................................               ................................................  Date                                            Time    ..........................................................................................................................................  Reviewed by Signature/Title    ...................................................              ..............................................  Date                                                            Time

## 2017-07-06 NOTE — OP NOTE
PREOPERATIVE DIAGNOSES:   1. Chronic tonsillitis.   POSTOPERATIVE DIAGNOSES:   1. Chronic tonsillitis.   PROCEDURE PERFORMED: Tonsillectomy  SURGEON: Luis F Phillips MD   ASSISTANT: None  BLOOD LOSS: 5 mL.   COMPLICATIONS: None.   IMPLANTS: NONE  SPECIMENS: None.   ANESTHESIA: GETA.   INDICATIONS: Maria A Osuna presented to me with a longstanding history of chronic tonsillitis. In addition, the patient had constant nasal airway obstruction due to adenoid hypertrophy as well, and therefore my recommendation was for surgery. Preoperatively, the risks discussed included the risks of infection, bleeding, the risks of general anesthesia. Also, the possibility of need for emergent return to the operating room was discussed. They understood and wished to proceed.   OPERATIVE PROCEDURE: After being taken to the operating room and induction of general endotracheal tube anesthesia, the bed was rotated 90 degrees and a shoulder roll and head turban were placed. I suspended the patient from the Killeen stand using a Boris-Adam mouthgag, and I grasped the right tonsil with an Allis forceps and retracted medially and performed subcapsular dissection utilizing monopolar cautery, and the right tonsil came out very smoothly. I then turned my attention to the left side, once again using an Allis forceps to grasp it and retract it medially, and then I performed subcapsular dissection, and the left tonsil also came out very smoothly. I released the mouthgag for 2 minutes to allow recirculation of blood to the tongue.   I resuspended the patient from the Killeen stand using a Boris-Adam mouthgag, and then slipped a small soft catheter through the right nasal cavity out of the mouth to retract the soft palate forward. After I did this, I inspected the nasopharynx. There was no significant adenoid tissue. I removed the catheter from the mouth and reinspected the tonsil beds and there was good hemostasis. I applied a thin film of the  hemostatic powder to the tonsil beds bilaterally and removed the mouthgag. The bed was rotated 90 degrees after I removed the shoulder roll and head turban, and the patient was awakened, extubated and sent to the recovery room in good condition.

## 2017-07-06 NOTE — ANESTHESIA POSTPROCEDURE EVALUATION
Patient: Maria A Osuna    Procedure(s):  Tonsillectomy  - Wound Class: II-Clean Contaminated    Diagnosis:chronic tonsillitis  Diagnosis Additional Information: No value filed.    Anesthesia Type:  General, ETT    Note:  Anesthesia Post Evaluation    Patient location during evaluation: Phase 2  Patient participation: Able to fully participate in evaluation  Level of consciousness: awake and alert  Pain management: adequate  Airway patency: patent  Cardiovascular status: acceptable  Respiratory status: acceptable  Hydration status: acceptable  PONV: none     Anesthetic complications: None          Last vitals:  Vitals:    07/06/17 0930 07/06/17 0945 07/06/17 0959   BP: 116/79 116/80 116/82   Resp: 16 16 16   Temp:      SpO2: 98% 99% 99%         Electronically Signed By: Moisés Heath DO  July 6, 2017  10:06 AM

## 2017-07-06 NOTE — ANESTHESIA PREPROCEDURE EVALUATION
Anesthesia Evaluation     . Pt has had prior anesthetic.     No history of anesthetic complications          ROS/MED HX    ENT/Pulmonary: Comment: chronic tonsilitis     (-) tobacco use   Neurologic:  - neg neurologic ROS     Cardiovascular:  - neg cardiovascular ROS   (+) ----. : . . . :. . No previous cardiac testing       METS/Exercise Tolerance:  >4 METS   Hematologic:  - neg hematologic  ROS       Musculoskeletal:  - neg musculoskeletal ROS       GI/Hepatic:     (+) Inflammatory bowel disease,       Renal/Genitourinary:  - ROS Renal section negative       Endo:  - neg endo ROS       Psychiatric:  - neg psychiatric ROS       Infectious Disease:  - neg infectious disease ROS       Malignancy:      - no malignancy   Other:    - neg other ROS                 Physical Exam  Normal systems: cardiovascular, pulmonary and dental    Airway   Mallampati: II  TM distance: >3 FB  Neck ROM: full    Dental     Cardiovascular   Rhythm and rate: regular and normal      Pulmonary    breath sounds clear to auscultation                    Anesthesia Plan      History & Physical Review      ASA Status:  1 .    NPO Status:  > 8 hours    Plan for General and ETT with Intravenous and Propofol induction.          Postoperative Care      Consents                          .

## 2017-07-10 LAB — COPATH REPORT: NORMAL

## 2017-07-18 ENCOUNTER — OFFICE VISIT (OUTPATIENT)
Dept: OTOLARYNGOLOGY | Facility: CLINIC | Age: 37
End: 2017-07-18
Payer: COMMERCIAL

## 2017-07-18 VITALS — WEIGHT: 130 LBS | RESPIRATION RATE: 12 BRPM | BODY MASS INDEX: 20.4 KG/M2 | HEIGHT: 67 IN

## 2017-07-18 DIAGNOSIS — J35.01 CHRONIC TONSILLITIS: Primary | ICD-10-CM

## 2017-07-18 DIAGNOSIS — J35.3 ENLARGED TONSILS AND ADENOIDS: ICD-10-CM

## 2017-07-18 PROCEDURE — 99024 POSTOP FOLLOW-UP VISIT: CPT | Performed by: OTOLARYNGOLOGY

## 2017-07-18 NOTE — PROGRESS NOTES
"History of Present Illness - Maria A Osuna is a 37 year old female who is status post tonsillectomy on 7/6/17.  There was the expected amount of discomfort in the postoperative period, but at this point the patient is back to a regular diet, and not needing pain medication.  There was no bleeding, and no fevers or chills.    Resp 12  Ht 1.702 m (5' 7\")  Wt 59 kg (130 lb)  LMP 07/05/2017  BMI 20.36 kg/m2    General - The patient is well nourished and well developed, and appears to have good nutritional status.  Alert and oriented to person and place, answers questions and cooperates with examination appropriately.   Head and Face - Normocephalic and atraumatic, with no gross asymmetry noted of the contour of the facial features.  The facial nerve is intact, with strong symmetric movements.  Eyes - Extraocular movements intact, and the pupils were reactive to light.  Sclera were not icteric or injected, conjunctiva were pink and moist.  Neck - Normal midline excursion of the laryngotracheal complex during swallowing.  Full range of motion on passive movement.  Palpation of the occipital, submental, submandibular, internal jugular chain, and supraclavicular nodes did not demonstrate any abnormal lymph nodes or masses.  The carotid pulse was palpable bilaterally.  Palpation of the thyroid was soft and smooth, with no nodules or goiter appreciated.  The trachea was mobile and midline.  Mouth - Examination of the oral cavity shows pink, healthy, moist mucosa.  No lesions or ulceration noted.  The dentition are in good repair.  The tongue is mobile and midline.  Oropharynx - The tonsil beds are remucosalizing appropriately.  No signs of bleeding or clots.  The Uvula is midline and the soft palate is symmetric.     A/P - Maria A Osuna has had an uncomplicated tonsillectomy.  They have no restrictions at this point and can return on an as needed basis.    I have encouraged her that the slight alteration " in her sense of taste will resolve in a short period of time.  I have never seen that side effect be permanent.

## 2017-07-18 NOTE — PATIENT INSTRUCTIONS
Scheduling Information  To schedule your CT/MRI scan, please contact [facility] at [phone] OR [facility] at [phone]    To schedule your Surgery, please contact our Specialty Schedulers at [phone]      ENT Clinic Locations Clinic Hours Telephone Number     [facility]  [address] [place] [address]   Monday:           1:00pm -- 5:00pm    Friday:              8:00am - 12:00pm   To schedule/reschedule an appointment with   Dr. Phillips,   please contact our   Specialty Scheduling Department at:     [phone]       [facility]  [address] [place] [address] Tuesday:          8:00am -- 2:00pm         Urgent Care Locations Clinic Hours Telephone Numbers     [facility]  [address] [place] [address]     Monday-Friday:     11:00am - 9:00pm    Saturday-Sunday:  9:00am - 5:00pm   [phone]     [facility]  [address] [place] [address]     Monday-Friday:      5:00pm - 9:00pm     Saturday-Sunday:  9:00am - 5:00pm   [phone]

## 2017-07-18 NOTE — MR AVS SNAPSHOT
After Visit Summary   7/18/2017    Maria A Osuna    MRN: [id number]           Patient Information     Date Of Birth          1980        Visit Information        Provider Department      7/18/2017 8:00 AM Luis F Phillips MD [facility]        Today's Diagnoses     Chronic tonsillitis    -  1    Enlarged tonsils and adenoids          Care Instructions    Scheduling Information  To schedule your CT/MRI scan, please contact [facility] at [phone] OR [facility] at [phone]    To schedule your Surgery, please contact our Specialty Schedulers at [phone]      ENT Clinic Locations Clinic Hours Telephone Number     [facility]  [address]  [place] [address]   Monday:           1:00pm -- 5:00pm    Friday:              8:00am - 12:00pm   To schedule/reschedule an appointment with   Dr. Phillips,   please contact our   Specialty Scheduling Department at:     [phone]       [facility]  [address] [place] [address] Tuesday:          8:00am -- 2:00pm         Urgent Care Locations Clinic Hours Telephone Numbers     [facility]  [address] [place] [address]     Monday-Friday:     11:00am - 9:00pm    Saturday-Sunday:  9:00am - 5:00pm   [phone]     [facility]  [address] [place] [address]     Monday-Friday:      5:00pm - 9:00pm     Saturday-Sunday:  9:00am - 5:00pm   [phone]                 Follow-ups after your visit        Who to contact     If you have questions or need follow up information about today's clinic visit or your schedule please contact [facility] directly at [phone].  Normal or non-critical lab and imaging results will be communicated to you by MyChart, letter or phone within 4 business days after the clinic has received the results. If you do not hear from us within 7 days, please contact the clinic through MyChart or  "phone. If you have a critical or abnormal lab result, we will notify you by phone as soon as possible.  Submit refill requests through eMar or call your pharmacy and they will forward the refill request to us. Please allow 3 business days for your refill to be completed.          Additional Information About Your Visit        trivagohart Information     eMar gives you secure access to your electronic health record. If you see a primary care provider, you can also send messages to your care team and make appointments. If you have questions, please call your primary care clinic.  If you do not have a primary care provider, please call [phone] and they will assist you.        Care EveryWhere ID     This is your Care EveryWhere ID. This could be used by other organizations to access your [facility] medical records  [id number]        Your Vitals Were     Respirations Height Last Period BMI (Body Mass Index)          12 1.702 m (5' 7\") 07/05/2017 20.36 kg/m2         Blood Pressure from Last 3 Encounters:   07/06/17 116/82   06/26/17 107/74   04/11/17 114/77    Weight from Last 3 Encounters:   07/18/17 59 kg (130 lb)   06/26/17 58.5 kg (129 lb)   04/25/17 56.7 kg (125 lb)              Today, you had the following     No orders found for display       Primary Care Provider Office Phone # Fax #    Sharon Edwina Tijerina [phone]       [facility] [address]  [facility] [address]        Equal Access to Services     [facility] : Hadii aad ku hadasho Soomaali, waaxda luqadaha, qaybta kaalmada adeegyada, waxay leathain hayjose borrero'jose . So [facility] [phone].    ATENCIÓN: Si habla español, tiene a beckman disposición servicios gratuitos de asistencia lingüística. Llame al [phone].    We comply with applicable federal civil rights laws and [place] laws. We do not discriminate on the basis of race, color, national origin, age, disability sex, sexual orientation or gender " identity.            Thank you!     Thank you for choosing [facility]  for your care. Our goal is always to provide you with excellent care. Hearing back from our patients is one way we can continue to improve our services. Please take a few minutes to complete the written survey that you may receive in the mail after your visit with us. Thank you!             Your Updated Medication List - Protect others around you: Learn how to safely use, store and throw away your medicines at www.disposemymeds.org.          This list is accurate as of: 7/18/17  8:14 AM.  Always use your most recent med list.                   Brand Name Dispense Instructions for use Diagnosis    ondansetron 8 MG tablet    ZOFRAN    15 tablet    Take 1 tablet (8 mg) by mouth every 8 hours as needed for nausea    Tonsillitis, chronic       oxyCODONE 5 MG IR tablet    ROXICODONE    80 tablet    Take 1 tablet (5 mg) by mouth every 4 hours as needed for pain maximum 6 tablet(s) per day    Tonsillitis, chronic

## 2017-07-18 NOTE — NURSING NOTE
"Chief Complaint   Patient presents with     RECHECK     post op T and A       Initial Resp 12  Ht 1.702 m (5' 7\")  Wt 59 kg (130 lb)  LMP 07/05/2017  BMI 20.36 kg/m2 Estimated body mass index is 20.36 kg/(m^2) as calculated from the following:    Height as of this encounter: 1.702 m (5' 7\").    Weight as of this encounter: 59 kg (130 lb).  Medication Reconciliation: complete     Zi Mayorga CMA      "

## 2017-10-29 ENCOUNTER — HEALTH MAINTENANCE LETTER (OUTPATIENT)
Age: 37
End: 2017-10-29

## 2018-04-02 ENCOUNTER — OFFICE VISIT (OUTPATIENT)
Dept: URGENT CARE | Facility: URGENT CARE | Age: 38
End: 2018-04-02
Payer: COMMERCIAL

## 2018-04-02 VITALS
OXYGEN SATURATION: 99 % | HEART RATE: 78 BPM | BODY MASS INDEX: 19.97 KG/M2 | TEMPERATURE: 98 F | DIASTOLIC BLOOD PRESSURE: 78 MMHG | WEIGHT: 127.5 LBS | SYSTOLIC BLOOD PRESSURE: 115 MMHG

## 2018-04-02 DIAGNOSIS — H01.113 EYELID DERMATITIS, ALLERGIC/CONTACT, RIGHT: Primary | ICD-10-CM

## 2018-04-02 PROCEDURE — 99213 OFFICE O/P EST LOW 20 MIN: CPT | Performed by: FAMILY MEDICINE

## 2018-04-02 RX ORDER — POLYMYXIN B SULFATE AND TRIMETHOPRIM 1; 10000 MG/ML; [USP'U]/ML
SOLUTION OPHTHALMIC
Refills: 0 | COMMUNITY
Start: 2017-06-30 | End: 2019-11-09

## 2018-04-02 RX ORDER — FLUTICASONE PROPIONATE 50 MCG
SPRAY, SUSPENSION (ML) NASAL
COMMUNITY
Start: 2017-07-17 | End: 2019-11-09

## 2018-04-02 RX ORDER — AMOXICILLIN 500 MG/1
CAPSULE ORAL
Refills: 0 | COMMUNITY
Start: 2017-12-06 | End: 2019-11-09

## 2018-04-02 RX ORDER — PREDNISOLONE ACETATE 10 MG/ML
1 SUSPENSION/ DROPS OPHTHALMIC
Qty: 1 BOTTLE | Refills: 0 | Status: SHIPPED | OUTPATIENT
Start: 2018-04-02 | End: 2018-04-09

## 2018-04-02 NOTE — MR AVS SNAPSHOT
After Visit Summary   4/2/2018    Maria A Osuna    MRN: [id number]           Patient Information     Date Of Birth          1980        Visit Information        Provider Department      4/2/2018 6:55 PM Heri Pizarro MD [facility]        Today's Diagnoses     Eyelid dermatitis, allergic/contact, right    -  1      Care Instructions      General Allergic Reactions  An allergic reaction is a set of symptoms caused by an allergen. An allergen is something that causes a person s immune system to react. When a person comes in contact with an allergen, it causes the body to release chemicals. These include the chemical histamine. Histamine causes swelling and itching. It may affect the entire body. This is called a general allergic reaction. Often symptoms affect only 1 part of the body. This is called a local allergic reaction.  You are having an allergic reaction. Almost anything can cause one. Different people are allergic to different things. It is usually something that you ate or swallowed, came into contact with by getting or putting it on your skin or clothes, or something you breathed in the air. This can be very annoying and sometimes scary.  Most of us think of allergic reactions when we have a rash or itchy skin. Symptoms can include:    Itching of the eyes, nose, and roof of the mouth    Runny or stuffy nose    Watery eyes     Sneezing or coughing     A blocked feeling in the ear    Red, itchy rash called hives    Red and purple spots    Rash, redness, welts, blisters    Itching, burning, stinging, pain    Dry, flaky, cracking, scaly skin  Severe symptoms include:    Swelling of the face, lips, or other parts of the body    Hoarse voice    Trouble swallowing, feeling like your throat is closing    Trouble breathing, wheezing    Nausea, vomiting, diarrhea, stomach cramps    Feeling faint or lightheaded, rapid heart rate  Sometimes the cause may be  obvious. But there are so many things that can cause a reaction that you may not be able to figure out. The most important things to help find your allergen are:    Remembering when it started    What you were doing at the time or just before that    Any activities you were involved in    Any new products or contacts  Below are some common causes. But remember that almost anything can cause a reaction. You may not even be aware that you came into contact with one of these things:    Dust, mold, pollen    Plants (common ones are poison ivy and poison oak, but there are many others)     Animals    Foods such as shrimp, shellfish, peanuts, milk products, gluten, and eggs. Also food colorings, flavorings, and additives.    Insect bites or stings such as bees, mosquitos, fleas, ticks    Medicines such as penicillin, sulfa medicines, amoxicillin, aspirin, and ibuprofen. But any medicine can cause a reaction.    Jewelry such as nickel or gold. This can be new, or something you ve worn for a while, including zippers and buttons.    Latex such as in gloves, clothes, toys, balloons, or some tapes. Some people allergic to latex may also have problems with foods like bananas, avocados, kiwi, papaya, or chestnuts.    Lotions, perfumes, cosmetics, soaps, shampoos, skincare products, nail products    Chemicals or dyes in clothing, linen, , hair dyes, soaps, iodine  Many viruses and common colds can cause a rash that is not an allergic reaction. Sometimes it is hard to tell the difference between allergies, sensitivity, or an intolerance to something. This is especially true with food. Many things can cause diarrhea, vomiting, stomach cramps, and skin irritation.  Home care    The goal of treatment is to help relieve the symptoms and get you feeling better. The rash will usually fade over several days. But it can sometimes last a couple of weeks. Over the next couple of days, there may be times when it is gets a little  worse, and then better again. Here are some things to do:    If you know what you are allergic to, stay away from it. Future reactions could be worse than this one.    Avoid tight clothing and anything that heats up your skin (hot showers or baths, direct sunlight). Heat will make itching worse.    An ice pack will relieve local areas of intense itching and redness. To make an ice pack, put ice cubes in a plastic bag that seals at the top. Wrap it in a thin, clean towel. Don t put the ice directly on the skin because it can damage the skin.    Oral diphenhydramine is an over-the-counter antihistamine sold at pharmacy and grocery stores. Unless a prescription antihistamine was given, diphenhydramine may be used to reduce itching if large areas of the skin are involved. It may make you sleepy. So be careful using it in the daytime or when going to school, working, or driving. Note: Don t use diphenhydramine if you have glaucoma or if you are a man with trouble urinating due to an enlarged prostate. There are other antihistamines that won t make you so sleepy. These are good choices for daytime use. Ask your pharmacist for suggestions.    Don t use diphenhydramine cream on your skin. It can cause a further reaction in some people.    To help prevent an infection, don't scratch the affected area. Scratching may worsen the reaction and damage your skin. It can also lead to an infection. Always check the affected for signs of an infection.    Call your healthcare provider and ask what you can use to help decrease the itching.    To decrease allergic reactions, try the following:      Use heat-steam to clean your home    Use high-efficiency particulate (HEPA) vacuums and filters    Stay away from food and pet triggers    Kill any cockroaches    Clean your house often  Follow-up care  Follow up with your healthcare provider, or as advised. If you had a severe reaction today, or if you have had several mild to medium  allergic reactions in the past, ask your provider about allergy testing. This can help you find out what you are allergic to. If your reaction included dizziness, fainting, or trouble breathing or swallowing, ask your provider about carrying auto-injectable epinephrine.  Call 911  Call 911 if any of these occur:    Trouble breathing or swallowing, wheezing    Cool, moist, pale skin    Shortness of breath    Hoarse voice or trouble speaking    Confused     Very drowsy or trouble awakening    Fainting or loss of consciousness    Rapid heart rate    Feeling of dizziness or weakness or a sudden drop in blood pressure    Feeling of doom    Feeling lightheaded    Severe nausea or vomiting, or diarrhea    Seizure    Swelling in the face, eyelids, lips, mouth, throat or tongue    Drooling  When to seek medical advice  Call your healthcare provider right away if any of these occur:    Spreading areas of itching, redness or swelling    Nausea or stomach cramps or abdominal pain    Continuing or recurring symptoms    Spreading areas of redness, swelling, or itching    Signs of infection at the affected site:    Spreading redness    Increased pain or swelling    Fluid or colored drainage from the site    Fever of 100.4 F (38 C) or above lasting for 24 to 48 hours, or as directed by your provider  Date Last Reviewed: 3/1/2017    1436-8860 [employer]. [address] [place] [address]. All rights reserved. This information is not intended as a substitute for professional medical care. Always follow your healthcare professional's instructions.                Follow-ups after your visit        Follow-up notes from your care team     Return if symptoms worsen or fail to improve.      Who to contact     If you have questions or need follow up information about today's clinic visit or your schedule please contact [facility] directly at [phone].  Normal or non-critical lab and imaging  results will be communicated to you by [employer], letter or phone within 4 business days after the clinic has received the results. If you do not hear from us within 7 days, please contact the clinic through [employer] or phone. If you have a critical or abnormal lab result, we will notify you by phone as soon as possible.  Submit refill requests through [employer] or call your pharmacy and they will forward the refill request to us. Please allow 3 business days for your refill to be completed.          Additional Information About Your Visit        [employer] Information     [employer] gives you secure access to your electronic health record. If you see a primary care provider, you can also send messages to your care team and make appointments. If you have questions, please call your primary care clinic.  If you do not have a primary care provider, please call [phone] and they will assist you.        Care EveryWhere ID     This is your Care EveryWhere ID. This could be used by other organizations to access your [facility] medical records  [id number]        Your Vitals Were     Pulse Temperature Pulse Oximetry Breastfeeding? BMI (Body Mass Index)       78 98  F (36.7  C) (Oral) 99% No 19.97 kg/m2        Blood Pressure from Last 3 Encounters:   04/02/18 115/78   07/06/17 116/82   06/26/17 107/74    Weight from Last 3 Encounters:   04/02/18 127 lb 8 oz (57.8 kg)   07/18/17 130 lb (59 kg)   06/26/17 129 lb (58.5 kg)              Today, you had the following     No orders found for display         Today's Medication Changes          These changes are accurate as of 4/2/18  7:22 PM.  If you have any questions, ask your nurse or doctor.               Start taking these medicines.        Dose/Directions    prednisoLONE acetate 1 % ophthalmic susp   Commonly known as:  PRED FORTE   Used for:  Eyelid dermatitis, allergic/contact, right   Started by:  Heri Pizarro MD        Dose:  1 drop   Apply 1 drop to eye every 4 hours  (while awake) for 7 days   Quantity:  1 Bottle   Refills:  0            Where to get your medicines      These medications [place] [facility] [place] [address]  [address], [place] [address]     Phone:  [phone]     prednisoLONE acetate 1 % ophthalmic susp                Primary Care Provider Office Phone # Fax #    Sharon Tijerina [phone]       [address]  [facility] [place] [address]        Equal Access to Services     [facility]: Hadii aad ku hadasho Soomaali, waaxda luqadaha, qaybta kaalmada adeegyada, waxay idiin hayaan adeeg kharareuben peña . So [facility] [place] [phone].    ATENCIÓN: Si habla español, tiene a beckman disposición servicios gratuitos de asistencia lingüística. [facility] [phone].    We comply with applicable federal civil rights laws and [place] laws. We do not discriminate on the basis of race, color, national origin, age, disability, sex, sexual orientation, or gender identity.            Thank you!     Thank you for choosing [facility]  for your care. Our goal is always to provide you with excellent care. Hearing back from our patients is one way we can continue to improve our services. Please take a few minutes to complete the written survey that you may receive in the mail after your visit with us. Thank you!             Your Updated Medication List - Protect others around you: Learn how to safely use, store and throw away your medicines at www.disposemymeds.org.          This list is accurate as of 4/2/18  7:22 PM.  Always use your most recent med list.                   Brand Name Dispense Instructions for use Diagnosis    amoxicillin 500 MG capsule    AMOXIL     TAKE 2 CAPSULE BY MOUTH THREE TIMES A DAY AS DIRECTED        BENADRYL PO           fluticasone 50 MCG/ACT spray    FLONASE          ondansetron 8 MG tablet    ZOFRAN    15 tablet    Take 1 tablet (8 mg) by mouth every 8 hours as  needed for nausea    Tonsillitis, chronic       oxyCODONE IR 5 MG tablet    ROXICODONE    80 tablet    Take 1 tablet (5 mg) by mouth every 4 hours as needed for pain maximum 6 tablet(s) per day    Tonsillitis, chronic       prednisoLONE acetate 1 % ophthalmic susp    PRED FORTE    1 Bottle    Apply 1 drop to eye every 4 hours (while awake) for 7 days    Eyelid dermatitis, allergic/contact, right       trimethoprim-polymyxin b ophthalmic solution    POLYTRIM     INSTILL 1-2 DROPS IN AFF. EYE EVERY 3H WHILE AWAKE.CONTINUE FOR 24H AFTER RESOLUTION.MAX 6DOSE/24H

## 2018-04-03 NOTE — PROGRESS NOTES
SUBJECTIVE:   Maria A Osuna is a 37 year old female presenting with a chief complaint of   Chief Complaint   Patient presents with     Eye Swelling     Right eye, started swelling this morning and has not resolved, red as well, not matted in the morning, took Benadryl but it did nothing but take the swelling down       She is an established patient of [facility].    Onset of symptoms was 9 hour(s) ago.   Location: right eye   Course of illness is worsening.    Severity mild  Current and Associated symptoms: redness, eyelid swelling, itching  Treatment measures tried include benadryl with no relief  Context: Contact lens use      Review of Systems    Past Medical History:   Diagnosis Date     Irritable bowel syndrome 5/12/2015     Family History   Problem Relation Age of Onset     Family history unknown: Yes     Current Outpatient Prescriptions   Medication Sig Dispense Refill     DiphenhydrAMINE HCl (BENADRYL PO)        prednisoLONE acetate (PRED FORTE) 1 % ophthalmic susp Apply 1 drop to eye every 4 hours (while awake) for 7 days 1 Bottle 0     amoxicillin (AMOXIL) 500 MG capsule TAKE 2 CAPSULE BY MOUTH THREE TIMES A DAY AS DIRECTED  0     fluticasone (FLONASE) 50 MCG/ACT spray        trimethoprim-polymyxin b (POLYTRIM) ophthalmic solution INSTILL 1-2 DROPS IN AFF. EYE EVERY 3H WHILE AWAKE.CONTINUE FOR 24H AFTER RESOLUTION.MAX 6DOSE/24H  0     oxyCODONE (ROXICODONE) 5 MG IR tablet Take 1 tablet (5 mg) by mouth every 4 hours as needed for pain maximum 6 tablet(s) per day (Patient not taking: Reported on 4/2/2018) 80 tablet 0     ondansetron (ZOFRAN) 8 MG tablet Take 1 tablet (8 mg) by mouth every 8 hours as needed for nausea (Patient not taking: Reported on 4/2/2018) 15 tablet 1     Social History   Substance Use Topics     Smoking status: Never Smoker     Smokeless tobacco: Never Used     Alcohol use No       OBJECTIVE  /78 (BP Location: Left arm, Patient Position: Chair, Cuff Size: Adult Regular)   Pulse 78  Temp 98  F (36.7  C) (Oral)  Wt 127 lb 8 oz (57.8 kg)  SpO2 99%  Breastfeeding? No  BMI 19.97 kg/m2    Physical Exam    Labs:  No results found for this or any previous visit (from the past 24 hour(s)).    X-Ray was not done.    ASSESSMENT:      ICD-10-CM    1. Eyelid dermatitis, allergic/contact, right H01.113 prednisoLONE acetate (PRED FORTE) 1 % ophthalmic susp        Medical Decision Making:    Differential Diagnosis:  Blepharitis  Allergic  Stye  Foreign body      Serious Comorbid Conditions:  Adult:  None    PLAN:    Eye Problem: Try steroid eye drop and if not better in 1-2 days, see eye provider    Followup:    If not improving or if condition worsens, follow up with your Primary Care Provider    Patient Instructions     General Allergic Reactions  An allergic reaction is a set of symptoms caused by an allergen. An allergen is something that causes a person s immune system to react. When a person comes in contact with an allergen, it causes the body to release chemicals. These include the chemical histamine. Histamine causes swelling and itching. It may affect the entire body. This is called a general allergic reaction. Often symptoms affect only 1 part of the body. This is called a local allergic reaction.  You are having an allergic reaction. Almost anything can cause one. Different people are allergic to different things. It is usually something that you ate or swallowed, came into contact with by getting or putting it on your skin or clothes, or something you breathed in the air. This can be very annoying and sometimes scary.  Most of us think of allergic reactions when we have a rash or itchy skin. Symptoms can include:    Itching of the eyes, nose, and roof of the mouth    Runny or stuffy nose    Watery eyes     Sneezing or coughing     A blocked feeling in the ear    Red, itchy rash called hives    Red and purple spots    Rash, redness, welts, blisters    Itching, burning, stinging,  pain    Dry, flaky, cracking, scaly skin  Severe symptoms include:    Swelling of the face, lips, or other parts of the body    Hoarse voice    Trouble swallowing, feeling like your throat is closing    Trouble breathing, wheezing    Nausea, vomiting, diarrhea, stomach cramps    Feeling faint or lightheaded, rapid heart rate  Sometimes the cause may be obvious. But there are so many things that can cause a reaction that you may not be able to figure out. The most important things to help find your allergen are:    Remembering when it started    What you were doing at the time or just before that    Any activities you were involved in    Any new products or contacts  Below are some common causes. But remember that almost anything can cause a reaction. You may not even be aware that you came into contact with one of these things:    Dust, mold, pollen    Plants (common ones are poison ivy and poison oak, but there are many others)     Animals    Foods such as shrimp, shellfish, peanuts, milk products, gluten, and eggs. Also food colorings, flavorings, and additives.    Insect bites or stings such as bees, mosquitos, fleas, ticks    Medicines such as penicillin, sulfa medicines, amoxicillin, aspirin, and ibuprofen. But any medicine can cause a reaction.    Jewelry such as nickel or gold. This can be new, or something you ve worn for a while, including zippers and buttons.    Latex such as in gloves, clothes, toys, balloons, or some tapes. Some people allergic to latex may also have problems with foods like bananas, avocados, kiwi, papaya, or chestnuts.    Lotions, perfumes, cosmetics, soaps, shampoos, skincare products, nail products    Chemicals or dyes in clothing, linen, , hair dyes, soaps, iodine  Many viruses and common colds can cause a rash that is not an allergic reaction. Sometimes it is hard to tell the difference between allergies, sensitivity, or an intolerance to something. This is especially true  with food. Many things can cause diarrhea, vomiting, stomach cramps, and skin irritation.  Home care    The goal of treatment is to help relieve the symptoms and get you feeling better. The rash will usually fade over several days. But it can sometimes last a couple of weeks. Over the next couple of days, there may be times when it is gets a little worse, and then better again. Here are some things to do:    If you know what you are allergic to, stay away from it. Future reactions could be worse than this one.    Avoid tight clothing and anything that heats up your skin (hot showers or baths, direct sunlight). Heat will make itching worse.    An ice pack will relieve local areas of intense itching and redness. To make an ice pack, put ice cubes in a plastic bag that seals at the top. Wrap it in a thin, clean towel. Don t put the ice directly on the skin because it can damage the skin.    Oral diphenhydramine is an over-the-counter antihistamine sold at pharmacy and grocery stores. Unless a prescription antihistamine was given, diphenhydramine may be used to reduce itching if large areas of the skin are involved. It may make you sleepy. So be careful using it in the daytime or when going to school, working, or driving. Note: Don t use diphenhydramine if you have glaucoma or if you are a man with trouble urinating due to an enlarged prostate. There are other antihistamines that won t make you so sleepy. These are good choices for daytime use. Ask your pharmacist for suggestions.    Don t use diphenhydramine cream on your skin. It can cause a further reaction in some people.    To help prevent an infection, don't scratch the affected area. Scratching may worsen the reaction and damage your skin. It can also lead to an infection. Always check the affected for signs of an infection.    Call your healthcare provider and ask what you can use to help decrease the itching.    To decrease allergic reactions, try the  following:      Use heat-steam to clean your home    Use high-efficiency particulate (HEPA) vacuums and filters    Stay away from food and pet triggers    Kill any cockroaches    Clean your house often  Follow-up care  Follow up with your healthcare provider, or as advised. If you had a severe reaction today, or if you have had several mild to medium allergic reactions in the past, ask your provider about allergy testing. This can help you find out what you are allergic to. If your reaction included dizziness, fainting, or trouble breathing or swallowing, ask your provider about carrying auto-injectable epinephrine.  Call 911  Call 911 if any of these occur:    Trouble breathing or swallowing, wheezing    Cool, moist, pale skin    Shortness of breath    Hoarse voice or trouble speaking    Confused     Very drowsy or trouble awakening    Fainting or loss of consciousness    Rapid heart rate    Feeling of dizziness or weakness or a sudden drop in blood pressure    Feeling of doom    Feeling lightheaded    Severe nausea or vomiting, or diarrhea    Seizure    Swelling in the face, eyelids, lips, mouth, throat or tongue    Drooling  When to seek medical advice  Call your healthcare provider right away if any of these occur:    Spreading areas of itching, redness or swelling    Nausea or stomach cramps or abdominal pain    Continuing or recurring symptoms    Spreading areas of redness, swelling, or itching    Signs of infection at the affected site:    Spreading redness    Increased pain or swelling    Fluid or colored drainage from the site    Fever of 100.4 F (38 C) or above lasting for 24 to 48 hours, or as directed by your provider  Date Last Reviewed: 3/1/2017    5931-8379 [employer]. [address] [place] [address], [place] [address]. All rights reserved. This information is not intended as a substitute for professional medical care. Always follow your healthcare professional's instructions.

## 2018-04-03 NOTE — PATIENT INSTRUCTIONS
General Allergic Reactions  An allergic reaction is a set of symptoms caused by an allergen. An allergen is something that causes a person s immune system to react. When a person comes in contact with an allergen, it causes the body to release chemicals. These include the chemical histamine. Histamine causes swelling and itching. It may affect the entire body. This is called a general allergic reaction. Often symptoms affect only 1 part of the body. This is called a local allergic reaction.  You are having an allergic reaction. Almost anything can cause one. Different people are allergic to different things. It is usually something that you ate or swallowed, came into contact with by getting or putting it on your skin or clothes, or something you breathed in the air. This can be very annoying and sometimes scary.  Most of us think of allergic reactions when we have a rash or itchy skin. Symptoms can include:    Itching of the eyes, nose, and roof of the mouth    Runny or stuffy nose    Watery eyes     Sneezing or coughing     A blocked feeling in the ear    Red, itchy rash called hives    Red and purple spots    Rash, redness, welts, blisters    Itching, burning, stinging, pain    Dry, flaky, cracking, scaly skin  Severe symptoms include:    Swelling of the face, lips, or other parts of the body    Hoarse voice    Trouble swallowing, feeling like your throat is closing    Trouble breathing, wheezing    Nausea, vomiting, diarrhea, stomach cramps    Feeling faint or lightheaded, rapid heart rate  Sometimes the cause may be obvious. But there are so many things that can cause a reaction that you may not be able to figure out. The most important things to help find your allergen are:    Remembering when it started    What you were doing at the time or just before that    Any activities you were involved in    Any new products or contacts  Below are some common causes. But remember that almost anything can cause a  reaction. You may not even be aware that you came into contact with one of these things:    Dust, mold, pollen    Plants (common ones are poison ivy and poison oak, but there are many others)     Animals    Foods such as shrimp, shellfish, peanuts, milk products, gluten, and eggs. Also food colorings, flavorings, and additives.    Insect bites or stings such as bees, mosquitos, fleas, ticks    Medicines such as penicillin, sulfa medicines, amoxicillin, aspirin, and ibuprofen. But any medicine can cause a reaction.    Jewelry such as nickel or gold. This can be new, or something you ve worn for a while, including zippers and buttons.    Latex such as in gloves, clothes, toys, balloons, or some tapes. Some people allergic to latex may also have problems with foods like bananas, avocados, kiwi, papaya, or chestnuts.    Lotions, perfumes, cosmetics, soaps, shampoos, skincare products, nail products    Chemicals or dyes in clothing, linen, , hair dyes, soaps, iodine  Many viruses and common colds can cause a rash that is not an allergic reaction. Sometimes it is hard to tell the difference between allergies, sensitivity, or an intolerance to something. This is especially true with food. Many things can cause diarrhea, vomiting, stomach cramps, and skin irritation.  Home care    The goal of treatment is to help relieve the symptoms and get you feeling better. The rash will usually fade over several days. But it can sometimes last a couple of weeks. Over the next couple of days, there may be times when it is gets a little worse, and then better again. Here are some things to do:    If you know what you are allergic to, stay away from it. Future reactions could be worse than this one.    Avoid tight clothing and anything that heats up your skin (hot showers or baths, direct sunlight). Heat will make itching worse.    An ice pack will relieve local areas of intense itching and redness. To make an ice pack, put ice  cubes in a plastic bag that seals at the top. Wrap it in a thin, clean towel. Don t put the ice directly on the skin because it can damage the skin.    Oral diphenhydramine is an over-the-counter antihistamine sold at pharmacy and grocery stores. Unless a prescription antihistamine was given, diphenhydramine may be used to reduce itching if large areas of the skin are involved. It may make you sleepy. So be careful using it in the daytime or when going to school, working, or driving. Note: Don t use diphenhydramine if you have glaucoma or if you are a man with trouble urinating due to an enlarged prostate. There are other antihistamines that won t make you so sleepy. These are good choices for daytime use. Ask your pharmacist for suggestions.    Don t use diphenhydramine cream on your skin. It can cause a further reaction in some people.    To help prevent an infection, don't scratch the affected area. Scratching may worsen the reaction and damage your skin. It can also lead to an infection. Always check the affected for signs of an infection.    Call your healthcare provider and ask what you can use to help decrease the itching.    To decrease allergic reactions, try the following:      Use heat-steam to clean your home    Use high-efficiency particulate (HEPA) vacuums and filters    Stay away from food and pet triggers    Kill any cockroaches    Clean your house often  Follow-up care  Follow up with your healthcare provider, or as advised. If you had a severe reaction today, or if you have had several mild to medium allergic reactions in the past, ask your provider about allergy testing. This can help you find out what you are allergic to. If your reaction included dizziness, fainting, or trouble breathing or swallowing, ask your provider about carrying auto-injectable epinephrine.  Call 911  Call 911 if any of these occur:    Trouble breathing or swallowing, wheezing    Cool, moist, pale skin    Shortness of  breath    Hoarse voice or trouble speaking    Confused     Very drowsy or trouble awakening    Fainting or loss of consciousness    Rapid heart rate    Feeling of dizziness or weakness or a sudden drop in blood pressure    Feeling of doom    Feeling lightheaded    Severe nausea or vomiting, or diarrhea    Seizure    Swelling in the face, eyelids, lips, mouth, throat or tongue    Drooling  When to seek medical advice  Call your healthcare provider right away if any of these occur:    Spreading areas of itching, redness or swelling    Nausea or stomach cramps or abdominal pain    Continuing or recurring symptoms    Spreading areas of redness, swelling, or itching    Signs of infection at the affected site:    Spreading redness    Increased pain or swelling    Fluid or colored drainage from the site    Fever of 100.4 F (38 C) or above lasting for 24 to 48 hours, or as directed by your provider  Date Last Reviewed: 3/1/2017    5440-1919 [employer]. [address] [place] [address]. All rights reserved. This information is not intended as a substitute for professional medical care. Always follow your healthcare professional's instructions.

## 2018-04-03 NOTE — NURSING NOTE
"Chief Complaint   Patient presents with     Eye Swelling     Right eye, started swelling this morning and has not resolved, red as well, not matted in the morning, took Benadryl but it did nothing but take the swelling down       Initial /78 (BP Location: Left arm, Patient Position: Chair, Cuff Size: Adult Regular)  Pulse 78  Temp 98  F (36.7  C) (Oral)  Wt 127 lb 8 oz (57.8 kg)  SpO2 99%  Breastfeeding? No  BMI 19.97 kg/m2 Estimated body mass index is 19.97 kg/(m^2) as calculated from the following:    Height as of 7/18/17: 5' 7\" (1.702 m).    Weight as of this encounter: 127 lb 8 oz (57.8 kg).  Medication Reconciliation: complete   Berta Altamirano MA    "

## 2018-06-28 ENCOUNTER — TELEPHONE (OUTPATIENT)
Dept: FAMILY MEDICINE | Facility: CLINIC | Age: 38
End: 2018-06-28

## 2018-06-28 NOTE — LETTER
June 28, 2018    Maria A Osuna  [address]  [place] [facility] [address]      Dear Maria A Osuna,     In order to ensure we are providing the best quality care, we have reviewed your chart and see that you are due for:    Physical with pap smear: Pap smear is a screening test used to detect cervical cancer. It is recommended every three years for women 21 and older. The test should be done at least once every three years but women who are at greater risk for cervical cancer may need to have the test more often.    Please call the clinic at your earliest convenience to schedule an appointment. If you have had any of the screenings listed above at another facility, please call us so that we may update your chart.      Thank you for trusting us with your health care.    Sincerely,    [facility]/mb  [phone]  [phone]

## 2018-06-28 NOTE — TELEPHONE ENCOUNTER
Panel Management Review      BP Readings from Last 1 Encounters:   04/02/18 115/78      Last Office Visit with this department: Visit date not found    Fail List measure: PAP      Patient is due/failing the following:   PAP    Action needed:   Patient needs office visit for PHYSICAL.    Type of outreach:    Sent letter.    Questions for provider review:    None                                                                                                                                    Edilberto Adorno MA      Chart routed to  .

## 2018-10-01 ENCOUNTER — OFFICE VISIT (OUTPATIENT)
Dept: URGENT CARE | Facility: URGENT CARE | Age: 38
End: 2018-10-01
Payer: COMMERCIAL

## 2018-10-01 VITALS
BODY MASS INDEX: 19.42 KG/M2 | TEMPERATURE: 98.2 F | OXYGEN SATURATION: 97 % | DIASTOLIC BLOOD PRESSURE: 75 MMHG | RESPIRATION RATE: 16 BRPM | HEART RATE: 76 BPM | WEIGHT: 124 LBS | SYSTOLIC BLOOD PRESSURE: 117 MMHG

## 2018-10-01 DIAGNOSIS — L03.011 CELLULITIS OF FINGER OF RIGHT HAND: Primary | ICD-10-CM

## 2018-10-01 PROCEDURE — 99213 OFFICE O/P EST LOW 20 MIN: CPT | Performed by: PHYSICIAN ASSISTANT

## 2018-10-01 ASSESSMENT — ENCOUNTER SYMPTOMS
CHILLS: 0
SHORTNESS OF BREATH: 0
NECK PAIN: 0
ALLERGIC/IMMUNOLOGIC NEGATIVE: 1
WOUND: 1
HEADACHES: 0
NAUSEA: 0
DIZZINESS: 0
HEMATOLOGIC/LYMPHATIC NEGATIVE: 1
NECK STIFFNESS: 0
COUGH: 0
MYALGIAS: 0
EYES NEGATIVE: 1
JOINT SWELLING: 0
LIGHT-HEADEDNESS: 0
MUSCULOSKELETAL NEGATIVE: 1
SORE THROAT: 0
VOMITING: 0
FEVER: 0
RESPIRATORY NEGATIVE: 1
WEAKNESS: 0
DIARRHEA: 0
BACK PAIN: 0
BRUISES/BLEEDS EASILY: 0
CARDIOVASCULAR NEGATIVE: 1
RHINORRHEA: 0
PALPITATIONS: 0
ARTHRALGIAS: 0
ENDOCRINE NEGATIVE: 1

## 2018-10-01 NOTE — MR AVS SNAPSHOT
After Visit Summary   10/1/2018    Maria A Osuna    MRN: [id number]           Patient Information     Date Of Birth          1980        Visit Information        Provider Department      10/1/2018 4:45 PM Edwardo Fairchild PA-C [facility]        Today's Diagnoses     Cellulitis of finger of right hand    -  1       Follow-ups after your visit        Who to contact     If you have questions or need follow up information about today's clinic visit or your schedule please contact [facility] directly at [phone].  Normal or non-critical lab and imaging results will be communicated to you by LaserLeaphart, letter or phone within 4 business days after the clinic has received the results. If you do not hear from us within 7 days, please contact the clinic through Stravat or phone. If you have a critical or abnormal lab result, we will notify you by phone as soon as possible.  Submit refill requests through [employer]ellione or call your pharmacy and they will forward the refill request to us. Please allow 3 business days for your refill to be completed.          Additional Information About Your Visit        MyChart Information     [employer] gives you secure access to your electronic health record. If you see a primary care provider, you can also send messages to your care team and make appointments. If you have questions, please call your primary care clinic.  If you do not have a primary care provider, please call [phone] and they will assist you.        Care EveryWhere ID     This is your Care EveryWhere ID. This could be used by other organizations to access your [facility] medical records  [id number]        Your Vitals Were     Pulse Temperature Respirations Last Period Pulse Oximetry BMI (Body Mass Index)    76 98.2  F (36.8  C) (Oral) 16 09/17/2018 (Approximate) 97% 19.42 kg/m2       Blood Pressure from Last 3 Encounters:   10/01/18 117/75   04/02/18 115/78    07/06/17 116/82    Weight from Last 3 Encounters:   10/01/18 124 lb (56.2 kg)   04/02/18 127 lb 8 oz (57.8 kg)   07/18/17 130 lb (59 kg)              Today, you had the following     No orders found for display         Today's Medication Changes          These changes are accurate as of 10/1/18  6:00 PM.  If you have any questions, ask your nurse or doctor.               Start taking these medicines.        Dose/Directions    amoxicillin-clavulanate 875-125 MG per tablet   Commonly known as:  AUGMENTIN   Used for:  Cellulitis of finger of right hand   Started by:  Edwardo Fairchild PA-C        Dose:  1 tablet   Take 1 tablet by mouth 2 times daily for 7 days   Quantity:  14 tablet   Refills:  0            Where to get your medicines      These medications were sent to [facility] - [place], [place] - [address]  [address], [facility] [address]     Phone:  [phone]     amoxicillin-clavulanate 875-125 MG per tablet                Primary Care Provider Office Phone # Fax #    Sharon Edwina Tijerina [phone]       [address]  [facility] [address]        Equal Access to Services     ARNOLD RIBEIRO AH: Hadii mo ku hadasho Soomaali, waaxda luqadaha, qaybta kaalmada adeegyada, waxay idiin hayviraln evita jessica. So [facility] [phone].    ATENCIÓN: Si habla español, tiene a beckman disposición servicios gratuitos de asistencia lingüística. Llame al [phone].    We comply with applicable federal civil rights laws and [place] laws. We do not discriminate on the basis of race, color, national origin, age, disability, sex, sexual orientation, or gender identity.            Thank you!     Thank you for choosing [facility]  for your care. Our goal is always to provide you with excellent care. Hearing back from our patients is one way we can continue to improve our services. Please take a few minutes to complete the written survey that you  may receive in the mail after your visit with us. Thank you!             Your Updated Medication List - Protect others around you: Learn how to safely use, store and throw away your medicines at www.disposemymeds.org.          This list is accurate as of 10/1/18  6:00 PM.  Always use your most recent med list.                   Brand Name Dispense Instructions for use Diagnosis    amoxicillin 500 MG capsule    AMOXIL     TAKE 2 CAPSULE BY MOUTH THREE TIMES A DAY AS DIRECTED        amoxicillin-clavulanate 875-125 MG per tablet    AUGMENTIN    14 tablet    Take 1 tablet by mouth 2 times daily for 7 days    Cellulitis of finger of right hand       BENADRYL PO           fluticasone 50 MCG/ACT spray    FLONASE          ondansetron 8 MG tablet    ZOFRAN    15 tablet    Take 1 tablet (8 mg) by mouth every 8 hours as needed for nausea    Tonsillitis, chronic       oxyCODONE IR 5 MG tablet    ROXICODONE    80 tablet    Take 1 tablet (5 mg) by mouth every 4 hours as needed for pain maximum 6 tablet(s) per day    Tonsillitis, chronic       trimethoprim-polymyxin b ophthalmic solution    POLYTRIM     INSTILL 1-2 DROPS IN AFF. EYE EVERY 3H WHILE AWAKE.CONTINUE FOR 24H AFTER RESOLUTION.MAX 6DOSE/24H

## 2018-10-01 NOTE — PROGRESS NOTES
Chief Complaint:    Chief Complaint   Patient presents with     Finger     Cut on right thumb, possibly infected       HPI: Maria A Osuna is an 38 year old female who presents for evaluation and treatment of possible R thumb infection.  Patient was bitten by her new puppy 1 week ago.  She has noticed some redness and swelling of the R distal thumb.  She states that the swelling and redness have gotten worse.  She does have some pain in the thumb.  She denies any numbness or tingling in the thumb.  She is up to date on her tetanus.      ROS:      Review of Systems   Constitutional: Negative for chills and fever.   HENT: Negative for congestion, ear pain, rhinorrhea and sore throat.    Eyes: Negative.    Respiratory: Negative.  Negative for cough and shortness of breath.    Cardiovascular: Negative.  Negative for chest pain and palpitations.   Gastrointestinal: Negative for diarrhea, nausea and vomiting.   Endocrine: Negative.    Genitourinary: Negative.    Musculoskeletal: Negative.  Negative for arthralgias, back pain, joint swelling, myalgias, neck pain and neck stiffness.   Skin: Positive for wound. Negative for rash.   Allergic/Immunologic: Negative.  Negative for immunocompromised state.   Neurological: Negative for dizziness, weakness, light-headedness and headaches.   Hematological: Negative.  Does not bruise/bleed easily.        Family History   Family History   Problem Relation Age of Onset     Family history unknown: Yes       Social History  Social History     Social History     Marital status:      Spouse name: N/A     Number of children: N/A     Years of education: N/A     Occupational History     Not on file.     Social History Main Topics     Smoking status: Never Smoker     Smokeless tobacco: Never Used     Alcohol use No     Drug use: No     Sexual activity: Yes     Partners: Male     Birth control/ protection: None, Male Surgical     Other Topics Concern     Not on file     Social  History Narrative        Surgical History:  Past Surgical History:   Procedure Laterality Date     TONSILLECTOMY, ADENOIDECTOMY, COMBINED Bilateral 7/6/2017    Procedure: COMBINED TONSILLECTOMY, ADENOIDECTOMY;  Tonsillectomy ;  Surgeon: Luis F Phillips MD;  Location: [facility] OR     wisdom teeth[  2000        Problem List:  Patient Active Problem List   Diagnosis     Irritable bowel syndrome     Chronic tonsillitis     Advanced directives, counseling/discussion     Enlarged tonsils and adenoids        Allergies:  No Known Allergies     Current Meds:    Current Outpatient Prescriptions:      amoxicillin-clavulanate (AUGMENTIN) 875-125 MG per tablet, Take 1 tablet by mouth 2 times daily for 7 days, Disp: 14 tablet, Rfl: 0     DiphenhydrAMINE HCl (BENADRYL PO), , Disp: , Rfl:      fluticasone (FLONASE) 50 MCG/ACT spray, , Disp: , Rfl:      trimethoprim-polymyxin b (POLYTRIM) ophthalmic solution, INSTILL 1-2 DROPS IN AFF. EYE EVERY 3H WHILE AWAKE.CONTINUE FOR 24H AFTER RESOLUTION.MAX 6DOSE/24H, Disp: , Rfl: 0     amoxicillin (AMOXIL) 500 MG capsule, TAKE 2 CAPSULE BY MOUTH THREE TIMES A DAY AS DIRECTED, Disp: , Rfl: 0     ondansetron (ZOFRAN) 8 MG tablet, Take 1 tablet (8 mg) by mouth every 8 hours as needed for nausea (Patient not taking: Reported on 4/2/2018), Disp: 15 tablet, Rfl: 1     oxyCODONE (ROXICODONE) 5 MG IR tablet, Take 1 tablet (5 mg) by mouth every 4 hours as needed for pain maximum 6 tablet(s) per day (Patient not taking: Reported on 4/2/2018), Disp: 80 tablet, Rfl: 0  No current facility-administered medications for this visit.     Facility-Administered Medications Ordered in Other Visits:      lidocaine-EPINEPHrine 1.5 %-1:055032 injection, , , PRN, Thyr, Kwame D, 3 mL at 08/06/14 0616     ROPivacaine (NAROPIN) epidural, , , PRN, Thyr, Kwame D, 10 mL at 08/06/14 0619     PHYSICAL EXAM:     Vital signs noted and reviewed by Edwardo Fairchild  /75 (BP Location: Left arm, Patient Position: Chair, Cuff  Size: Adult Regular)  Pulse 76  Temp 98.2  F (36.8  C) (Oral)  Resp 16  Wt 124 lb (56.2 kg)  LMP 09/17/2018 (Approximate)  SpO2 97%  BMI 19.42 kg/m2     PEFR:    Physical Exam   Constitutional: She is oriented to person, place, and time. She appears well-developed and well-nourished. No distress.   HENT:   Head: Normocephalic and atraumatic.   Right Ear: Tympanic membrane and external ear normal.   Left Ear: Tympanic membrane and external ear normal.   Mouth/Throat: Oropharynx is clear and moist.   Eyes: EOM are normal. Pupils are equal, round, and reactive to light.   Neck: Normal range of motion. Neck supple.   Cardiovascular: Normal rate, regular rhythm, normal heart sounds and intact distal pulses.  Exam reveals no gallop and no friction rub.    No murmur heard.  Pulmonary/Chest: Effort normal and breath sounds normal. No respiratory distress.   Abdominal: Soft. Bowel sounds are normal. She exhibits no distension and no mass. There is no tenderness. There is no guarding.   Musculoskeletal:        Right hand: She exhibits tenderness and swelling. She exhibits normal range of motion, no bony tenderness, normal two-point discrimination, normal capillary refill, no deformity and no laceration. Normal sensation noted. Normal strength noted.        Hands:  Mild swelling and erythema on the lateral nail border of the R distal thumb.     Lymphadenopathy:     She has no cervical adenopathy.   Neurological: She is alert and oriented to person, place, and time. She has normal reflexes. No cranial nerve deficit.   Skin: Skin is warm and dry. She is not diaphoretic.   Psychiatric: She has a normal mood and affect. Her behavior is normal. Judgment and thought content normal.   Nursing note and vitals reviewed.        ASSESSMENT:     1. Cellulitis of finger of right hand         PLAN:     Patient is up to date on her tetanus.  Hand soaks 3 times per day.  Rx for Augmentin today.  Worrisome symptoms discussed with  instructions to go to the ED.  Patient verbalized understanding and agreed with this plan.     Edwardo Fairchild  10/1/2018, 4:52 PM

## 2019-11-09 ENCOUNTER — OFFICE VISIT (OUTPATIENT)
Dept: URGENT CARE | Facility: URGENT CARE | Age: 39
End: 2019-11-09
Payer: COMMERCIAL

## 2019-11-09 VITALS
OXYGEN SATURATION: 97 % | BODY MASS INDEX: 20.49 KG/M2 | SYSTOLIC BLOOD PRESSURE: 105 MMHG | HEART RATE: 91 BPM | WEIGHT: 130.8 LBS | DIASTOLIC BLOOD PRESSURE: 75 MMHG | RESPIRATION RATE: 18 BRPM | TEMPERATURE: 98.6 F

## 2019-11-09 DIAGNOSIS — H66.001 NON-RECURRENT ACUTE SUPPURATIVE OTITIS MEDIA OF RIGHT EAR WITHOUT SPONTANEOUS RUPTURE OF TYMPANIC MEMBRANE: Primary | ICD-10-CM

## 2019-11-09 DIAGNOSIS — R09.82 POST-NASAL DRIP: ICD-10-CM

## 2019-11-09 PROCEDURE — 99213 OFFICE O/P EST LOW 20 MIN: CPT | Performed by: NURSE PRACTITIONER

## 2019-11-09 RX ORDER — TRIAMCINOLONE ACETONIDE 55 UG/1
2 SPRAY, METERED NASAL DAILY
Qty: 10.8 ML | Refills: 0 | Status: SHIPPED | OUTPATIENT
Start: 2019-11-09 | End: 2020-08-13

## 2019-11-09 RX ORDER — AMOXICILLIN 875 MG
875 TABLET ORAL 2 TIMES DAILY
Qty: 20 TABLET | Refills: 0 | Status: SHIPPED | OUTPATIENT
Start: 2019-11-09 | End: 2019-11-17

## 2019-11-09 ASSESSMENT — ENCOUNTER SYMPTOMS
VOMITING: 0
FATIGUE: 0
SHORTNESS OF BREATH: 0
ACTIVITY CHANGE: 1
DYSURIA: 0
WHEEZING: 0
COUGH: 1
APPETITE CHANGE: 0
DIARRHEA: 0
FEVER: 0

## 2019-11-09 NOTE — PROGRESS NOTES
SUBJECTIVE:   Maria A Osuna is a 39 year old female presenting with a chief complaint of   Chief Complaint   Patient presents with     Cough     x3 weeks. Started with a cold        She is an established patient of [facility].    URI Adult    Onset of symptoms was 3 weeks ago.  Course of illness is same  Current and Associated symptoms: cough - non-productive and ear pain right  Treatment measures tried include None tried.  Predisposing factors include ill contact: Family members  LMP: 11/7/2019      Review of Systems   Constitutional: Positive for activity change. Negative for appetite change, fatigue and fever.   HENT: Positive for ear pain.    Respiratory: Positive for cough. Negative for shortness of breath and wheezing.    Gastrointestinal: Negative for diarrhea and vomiting.   Genitourinary: Negative for dysuria.   All other systems reviewed and are negative.      Past Medical History:   Diagnosis Date     Irritable bowel syndrome 5/12/2015     Family History   Family history unknown: Yes     Current Outpatient Medications   Medication Sig Dispense Refill     amoxicillin (AMOXIL) 875 MG tablet Take 1 tablet (875 mg) by mouth 2 times daily for 10 days 20 tablet 0     triamcinolone (NASACORT) 55 MCG/ACT nasal aerosol Spray 2 sprays into both nostrils daily 10.8 mL 0     Social History     Tobacco Use     Smoking status: Never Smoker     Smokeless tobacco: Never Used   Substance Use Topics     Alcohol use: No       OBJECTIVE  /75   Pulse 91   Temp 98.6  F (37  C) (Oral)   Resp 18   Wt 59.3 kg (130 lb 12.8 oz)   SpO2 97%   BMI 20.49 kg/m      Physical Exam  Constitutional:       Appearance: She is not ill-appearing or diaphoretic.   HENT:      Right Ear: Ear canal and external ear normal. There is no impacted cerumen.      Left Ear: Tympanic membrane, ear canal and external ear normal. There is no impacted cerumen.      Ears:      Comments: RIGHT TM with fluid, erythema, dull, landmarks not  visualized     Nose: Congestion and rhinorrhea present.      Mouth/Throat:      Mouth: Mucous membranes are moist.      Pharynx: Posterior oropharyngeal erythema present. No oropharyngeal exudate.   Neck:      Musculoskeletal: Neck supple. No muscular tenderness.   Cardiovascular:      Rate and Rhythm: Normal rate and regular rhythm.      Pulses: Normal pulses.      Heart sounds: Normal heart sounds. No murmur. No friction rub. No gallop.    Pulmonary:      Effort: Pulmonary effort is normal.      Breath sounds: Normal breath sounds. No wheezing or rhonchi.   Lymphadenopathy:      Cervical: Cervical adenopathy present.   Skin:     General: Skin is warm.      Capillary Refill: Capillary refill takes less than 2 seconds.   Neurological:      General: No focal deficit present.      Mental Status: She is alert and oriented to person, place, and time. Mental status is at baseline.   Psychiatric:         Mood and Affect: Mood normal.         Behavior: Behavior normal.         Labs:  No results found for this or any previous visit (from the past 24 hour(s)).        ASSESSMENT:    ICD-10-CM    1. Non-recurrent acute suppurative otitis media of right ear without spontaneous rupture of tympanic membrane H66.001 amoxicillin (AMOXIL) 875 MG tablet   2. Post-nasal drip R09.82 triamcinolone (NASACORT) 55 MCG/ACT nasal aerosol        Medical Decision Making:    Differential Diagnosis:  URI Adult/Peds:  Acute left otitis media, Otitis externa, Viral syndrome and Viral upper respiratory illness    Serious Comorbid Conditions:  Adult:  None    PLAN: Discussed ear infection diagnosis, plan and treatment with antibiotics, advised completion of full course and follow up if symptoms do not improve.     Symptomatic cares for post nasal drip with Nasacort nasal spray, OTC tylenol or NSAIDs as needed for pain or fever over next 3 days. Follow up with PCP as needed. Patient agreed to the plan of care.     EVELIA Cook,  CNP      Patient Instructions     Patient Education     Middle Ear Infection (Adult)  You have an infection of the middle ear, the space behind the eardrum. This is also called acute otitis media (AOM). Sometimes it is caused by the common cold. This is because congestion can block the internal passage (eustachian tube) that drains fluid from the middle ear. When the middle ear fills with fluid, bacteria can grow there and cause an infection. Oral antibiotics are used to treat this illness, not ear drops. Symptoms usually start to improve within 1 to 2 days of treatment.    Home care  The following are general care guidelines:    Finish all of the antibiotic medicine given, even though you may feel better after the first few days.    You may use over-the-counter medicine, such as acetaminophen or ibuprofen, to control pain and fever, unless something else was prescribed. If you have chronic liver or kidney disease or have ever had a stomach ulcer or gastrointestinal bleeding, talk with your healthcare provider before using these medicines. Do not give aspirin to anyone under 18 years of age who has a fever. It may cause severe illness or death.  Follow-up care  Follow up with your healthcare provider, or as advised, in 2 weeks if all symptoms have not gotten better, or if hearing doesn't go back to normal within 1 month.  When to seek medical advice  Call your healthcare provider right away if any of these occur:    Ear pain gets worse or does not improve after 3 days of treatment    Unusual drowsiness or confusion    Neck pain, stiff neck, or headache    Fluid or blood draining from the ear canal    Fever of 100.4 F (38 C) or as advised     Seizure  Date Last Reviewed: 6/1/2016 2000-2018 [employer] [address] [place] [address], [place] [address]. All rights reserved. This information is not intended as a substitute for professional medical care. Always follow your healthcare professional's  instructions.

## 2019-11-09 NOTE — PATIENT INSTRUCTIONS
Patient Education     Middle Ear Infection (Adult)  You have an infection of the middle ear, the space behind the eardrum. This is also called acute otitis media (AOM). Sometimes it is caused by the common cold. This is because congestion can block the internal passage (eustachian tube) that drains fluid from the middle ear. When the middle ear fills with fluid, bacteria can grow there and cause an infection. Oral antibiotics are used to treat this illness, not ear drops. Symptoms usually start to improve within 1 to 2 days of treatment.    Home care  The following are general care guidelines:    Finish all of the antibiotic medicine given, even though you may feel better after the first few days.    You may use over-the-counter medicine, such as acetaminophen or ibuprofen, to control pain and fever, unless something else was prescribed. If you have chronic liver or kidney disease or have ever had a stomach ulcer or gastrointestinal bleeding, talk with your healthcare provider before using these medicines. Do not give aspirin to anyone under 18 years of age who has a fever. It may cause severe illness or death.  Follow-up care  Follow up with your healthcare provider, or as advised, in 2 weeks if all symptoms have not gotten better, or if hearing doesn't go back to normal within 1 month.  When to seek medical advice  Call your healthcare provider right away if any of these occur:    Ear pain gets worse or does not improve after 3 days of treatment    Unusual drowsiness or confusion    Neck pain, stiff neck, or headache    Fluid or blood draining from the ear canal    Fever of 100.4 F (38 C) or as advised     Seizure  Date Last Reviewed: 6/1/2016 2000-2018 [employer]. [address] [place] [address], [place] [address]. All rights reserved. This information is not intended as a substitute for professional medical care. Always follow your healthcare professional's instructions.

## 2019-11-17 ENCOUNTER — OFFICE VISIT (OUTPATIENT)
Dept: URGENT CARE | Facility: URGENT CARE | Age: 39
End: 2019-11-17
Payer: COMMERCIAL

## 2019-11-17 VITALS
DIASTOLIC BLOOD PRESSURE: 77 MMHG | TEMPERATURE: 98.3 F | OXYGEN SATURATION: 97 % | WEIGHT: 130 LBS | HEART RATE: 100 BPM | RESPIRATION RATE: 18 BRPM | BODY MASS INDEX: 20.36 KG/M2 | SYSTOLIC BLOOD PRESSURE: 109 MMHG

## 2019-11-17 DIAGNOSIS — H93.8X1 FULLNESS IN EAR, RIGHT: ICD-10-CM

## 2019-11-17 DIAGNOSIS — H65.91 OME (OTITIS MEDIA WITH EFFUSION), RIGHT: Primary | ICD-10-CM

## 2019-11-17 PROCEDURE — 99213 OFFICE O/P EST LOW 20 MIN: CPT | Performed by: FAMILY MEDICINE

## 2019-11-17 RX ORDER — PREDNISONE 20 MG/1
TABLET ORAL
Qty: 6 TABLET | Refills: 0 | Status: SHIPPED | OUTPATIENT
Start: 2019-11-17 | End: 2020-08-13

## 2019-11-17 RX ORDER — DOXYCYCLINE HYCLATE 100 MG
100 TABLET ORAL 2 TIMES DAILY
Qty: 20 TABLET | Refills: 0 | Status: SHIPPED | OUTPATIENT
Start: 2019-11-17 | End: 2019-11-27

## 2019-11-17 NOTE — PROGRESS NOTES
Subjective     Maria A Osuna is a 39 year old female who presents to clinic today for the following health issues:    HPI   Patient comes in today with pressure fullness pain in her right ear for the past week or so.  She reports she has been having sinus congestion, pressure in her face, for the past 4 weeks, postnasal drainage and cough.  She  She was seen in the urgent care last week she has been on amoxicillin and Nasacort with no improvement of her symptoms.    She denies any fever, she has no headache, no recent history of travel.  Denies diarrhea or vomiting    Reviewed and updated as needed this visit by Provider         Review of Systems   ROS COMP: Constitutional, HEENT, cardiovascular, pulmonary, gi and gu systems are negative, except as otherwise noted.      Objective    /77 (BP Location: Left arm, Patient Position: Chair, Cuff Size: Adult Regular)   Pulse 100   Temp 98.3  F (36.8  C) (Oral)   Resp 18   Wt 59 kg (130 lb)   SpO2 97%   BMI 20.36 kg/m    Body mass index is 20.36 kg/m .  Physical Exam   GENERAL: healthy, alert and no distress  HENT: normal cephalic/atraumatic,   right ear: erythematous and bulging membrane, left ear: normal: no effusions, no erythema, normal landmarks, nose and mouth without ulcers or lesions, oropharynx clear and oral mucous membranes moist  NECK: no adenopathy, no asymmetry, masses, or scars and thyroid normal to palpation  RESP: lungs clear to auscultation - no rales, rhonchi or wheezes    Diagnostic Test Results:  Labs reviewed in Epic  none         Assessment & Plan     1. OME (otitis media with effusion), right  Stop Amoxil  - doxycycline hyclate (VIBRA-TABS) 100 MG tablet; Take 1 tablet (100 mg) by mouth 2 times daily for 10 days  Dispense: 20 tablet; Refill: 0  - predniSONE (DELTASONE) 20 MG tablet; 2 tab daily for 3 days  Dispense: 6 tablet; Refill: 0    2. Fullness in ear, right  Advised with supportive care, may try over-the-counter  antihistamine as well.  Continue with the Nasacort      There are no Patient Instructions on file for this visit.    No follow-ups on file.    Kiana Bustillos MD  [facility]

## 2020-03-01 ENCOUNTER — HEALTH MAINTENANCE LETTER (OUTPATIENT)
Age: 40
End: 2020-03-01

## 2020-08-13 ENCOUNTER — OFFICE VISIT (OUTPATIENT)
Dept: OTOLARYNGOLOGY | Facility: CLINIC | Age: 40
End: 2020-08-13
Payer: COMMERCIAL

## 2020-08-13 VITALS
WEIGHT: 128.8 LBS | SYSTOLIC BLOOD PRESSURE: 122 MMHG | DIASTOLIC BLOOD PRESSURE: 79 MMHG | OXYGEN SATURATION: 96 % | BODY MASS INDEX: 20.17 KG/M2 | TEMPERATURE: 98.1 F | HEART RATE: 95 BPM

## 2020-08-13 DIAGNOSIS — J02.0 ACUTE STREPTOCOCCAL PHARYNGITIS: Primary | ICD-10-CM

## 2020-08-13 DIAGNOSIS — Z90.89 HISTORY OF TONSILLECTOMY: ICD-10-CM

## 2020-08-13 PROCEDURE — 31575 DIAGNOSTIC LARYNGOSCOPY: CPT | Performed by: OTOLARYNGOLOGY

## 2020-08-13 PROCEDURE — 99213 OFFICE O/P EST LOW 20 MIN: CPT | Mod: 25 | Performed by: OTOLARYNGOLOGY

## 2020-08-13 NOTE — LETTER
8/13/2020         RE: Maria A Osuna  [address]  [place] [address]        Dear Colleague,    Thank you for referring your patient, Maria A Osuna, to the [facility]. Please see a copy of my visit note below.    History of Present Illness - Maria A Osuna is a 40 year old female last seen on 7/18/2017, who is who is status post tonsillectomy on 7/6/17    The reason she has returned is again throat issues.  She tells me that there is something on the LEFT side, like a bump at the back of the tongue.  She call feel it back there.  She is not sure if she has a tonsil stone.  There is some pain deep down on the LEFT side.  She noticed this just a week ago.    No other issues, no coughing up blood, no dysphagia, no change in voice or dyspnea.    Past Medical History -   Patient Active Problem List   Diagnosis     Irritable bowel syndrome     Chronic tonsillitis     Advanced directives, counseling/discussion     Enlarged tonsils and adenoids       Current Medications -   Current Outpatient Medications:      predniSONE (DELTASONE) 20 MG tablet, 2 tab daily for 3 days, Disp: 6 tablet, Rfl: 0     triamcinolone (NASACORT) 55 MCG/ACT nasal aerosol, Spray 2 sprays into both nostrils daily, Disp: 10.8 mL, Rfl: 0    Allergies - No Known Allergies    Social History -   Social History     Socioeconomic History     Marital status:      Spouse name: Not on file     Number of children: Not on file     Years of education: Not on file     Highest education level: Not on file   Occupational History     Not on file   Social Needs     Financial resource strain: Not on file     Food insecurity     Worry: Not on file     Inability: Not on file     Transportation needs     Medical: Not on file     Non-medical: Not on file   Tobacco Use     Smoking status: Never Smoker     Smokeless tobacco: Never Used   Substance and Sexual Activity     Alcohol use: No     Drug use: No     Sexual  activity: Yes     Partners: Male     Birth control/protection: None, Male Surgical   Lifestyle     Physical activity     Days per week: Not on file     Minutes per session: Not on file     Stress: Not on file   Relationships     Social connections     Talks on phone: Not on file     Gets together: Not on file     Attends Orthodox service: Not on file     Active member of club or organization: Not on file     Attends meetings of clubs or organizations: Not on file     Relationship status: Not on file     Intimate partner violence     Fear of current or ex partner: Not on file     Emotionally abused: Not on file     Physically abused: Not on file     Forced sexual activity: Not on file   Other Topics Concern     Parent/sibling w/ CABG, MI or angioplasty before 65F 55M? Not Asked   Social History Narrative     Not on file       Family History -   Family History   Family history unknown: Yes       Review of Systems - As per HPI and PMHx, otherwise 10+ system review of the head and neck, and general constitution is negative.    Physical Exam  /79   Pulse 95   Temp 98.1  F (36.7  C) (Oral)   Wt 58.4 kg (128 lb 12.8 oz)   SpO2 96%   BMI 20.17 kg/m      General - The patient is well nourished and well developed, and appears to have good nutritional status.  Alert and oriented to person and place, answers questions and cooperates with examination appropriately.   Head and Face - Normocephalic and atraumatic, with no gross asymmetry noted of the contour of the facial features.  The facial nerve is intact, with strong symmetric movements.  Voice and Breathing - The patient was breathing comfortably without the use of accessory muscles. There was no wheezing, stridor, or stertor.  The patients voice was clear and strong, and had appropriate pitch and quality.  Ears - The tympanic membranes are normal in appearance, bony landmarks are intact.  No retraction, perforation, or masses.  No fluid or purulence was seen in  the external canal or the middle ear. No evidence of infection of the middle ear or external canal, cerumen was normal in appearance.  Eyes - Extraocular movements intact, and the pupils were reactive to light.  Sclera were not icteric or injected, conjunctiva were pink and moist.  Mouth - Examination of the oral cavity showed pink, healthy oral mucosa. No lesions or ulcerations noted.  The tongue was mobile and midline, and the dentition were in good condition.    Throat - The walls of the oropharynx were smooth, pink, moist, symmetric, and had no lesions or ulcerations.  The tonsillar pillars and soft palate were symmetric.  The uvula was midline on elevation.  The tonsil beds are healthy, clean and re mucosalized nicely.  There is perhaps a small mucocele at the inferior pole of the LEFT fossa  Neck - Normal midline excursion of the laryngotracheal complex during swallowing.  Full range of motion on passive movement.  Palpation of the occipital, submental, submandibular, internal jugular chain, and supraclavicular nodes did not demonstrate any abnormal lymph nodes or masses.  The carotid pulse was palpable bilaterally.  Palpation of the thyroid was soft and smooth, with no nodules or goiter appreciated.  The trachea was mobile and midline.    Flexible Endoscopy -     Attempts at mirror laryngoscopy were not possible due to gag reflex.  Therefore I proceeded with a fiberoptic examination.  First I sprayed both sides of the nose with a mixture of lidocaine and neosynephrine.  I then passed the scope through the nasal cavity.  Color photographs were taken for the permanent medical record.  The nasal cavity was unremarkable.  The nasopharynx was mucosally covered and symmetric.  The Eustachian tube openings were unobstructed.  Going further down I had a clear view of the base of tongue which had normal appearing lingual tonsillar tissue.  The base of tongue was free of lesions, and the vallecula was open.  The  epiglottis was smooth and mucosally covered.  The supraglottic larynx was then clearly visualized.  The vocal cords moved smoothly and symmetrically, they were pearly white and no lesions were seen.  The pyriform sinuses were open, and the limited view of the postcricoid region did not show any lesions.        A/P - Maria A Osuna is a 40 year old female    ICD-10-CM    1. Acute streptococcal pharyngitis  J02.0    2. History of tonsillectomy  Z90.89      I suspect that she might have a small scratch from eating granola. Or perhaps she can feel that tiny mucocele.  Either way, based on exam and endoscopy, I do not see any sign of regrowth of tonsil tissue, or any other disease process in the throat.  Give it a few weeks, and call if not resolving.      Again, thank you for allowing me to participate in the care of your patient.        Sincerely,        Luis F Phillips MD

## 2020-08-13 NOTE — PROGRESS NOTES
History of Present Illness - Maria A Osuna is a 40 year old female last seen on 7/18/2017, who is who is status post tonsillectomy on 7/6/17    The reason she has returned is again throat issues.  She tells me that there is something on the LEFT side, like a bump at the back of the tongue.  She call feel it back there.  She is not sure if she has a tonsil stone.  There is some pain deep down on the LEFT side.  She noticed this just a week ago.    No other issues, no coughing up blood, no dysphagia, no change in voice or dyspnea.    Past Medical History -   Patient Active Problem List   Diagnosis     Irritable bowel syndrome     Chronic tonsillitis     Advanced directives, counseling/discussion     Enlarged tonsils and adenoids       Current Medications -   Current Outpatient Medications:      predniSONE (DELTASONE) 20 MG tablet, 2 tab daily for 3 days, Disp: 6 tablet, Rfl: 0     triamcinolone (NASACORT) 55 MCG/ACT nasal aerosol, Spray 2 sprays into both nostrils daily, Disp: 10.8 mL, Rfl: 0    Allergies - No Known Allergies    Social History -   Social History     Socioeconomic History     Marital status:      Spouse name: Not on file     Number of children: Not on file     Years of education: Not on file     Highest education level: Not on file   Occupational History     Not on file   Social Needs     Financial resource strain: Not on file     Food insecurity     Worry: Not on file     Inability: Not on file     Transportation needs     Medical: Not on file     Non-medical: Not on file   Tobacco Use     Smoking status: Never Smoker     Smokeless tobacco: Never Used   Substance and Sexual Activity     Alcohol use: No     Drug use: No     Sexual activity: Yes     Partners: Male     Birth control/protection: None, Male Surgical   Lifestyle     Physical activity     Days per week: Not on file     Minutes per session: Not on file     Stress: Not on file   Relationships     Social connections     Talks on  phone: Not on file     Gets together: Not on file     Attends Bahai service: Not on file     Active member of club or organization: Not on file     Attends meetings of clubs or organizations: Not on file     Relationship status: Not on file     Intimate partner violence     Fear of current or ex partner: Not on file     Emotionally abused: Not on file     Physically abused: Not on file     Forced sexual activity: Not on file   Other Topics Concern     Parent/sibling w/ CABG, MI or angioplasty before 65F 55M? Not Asked   Social History Narrative     Not on file       Family History -   Family History   Family history unknown: Yes       Review of Systems - As per HPI and PMHx, otherwise 10+ system review of the head and neck, and general constitution is negative.    Physical Exam  /79   Pulse 95   Temp 98.1  F (36.7  C) (Oral)   Wt 58.4 kg (128 lb 12.8 oz)   SpO2 96%   BMI 20.17 kg/m      General - The patient is well nourished and well developed, and appears to have good nutritional status.  Alert and oriented to person and place, answers questions and cooperates with examination appropriately.   Head and Face - Normocephalic and atraumatic, with no gross asymmetry noted of the contour of the facial features.  The facial nerve is intact, with strong symmetric movements.  Voice and Breathing - The patient was breathing comfortably without the use of accessory muscles. There was no wheezing, stridor, or stertor.  The patients voice was clear and strong, and had appropriate pitch and quality.  Ears - The tympanic membranes are normal in appearance, bony landmarks are intact.  No retraction, perforation, or masses.  No fluid or purulence was seen in the external canal or the middle ear. No evidence of infection of the middle ear or external canal, cerumen was normal in appearance.  Eyes - Extraocular movements intact, and the pupils were reactive to light.  Sclera were not icteric or injected, conjunctiva  were pink and moist.  Mouth - Examination of the oral cavity showed pink, healthy oral mucosa. No lesions or ulcerations noted.  The tongue was mobile and midline, and the dentition were in good condition.    Throat - The walls of the oropharynx were smooth, pink, moist, symmetric, and had no lesions or ulcerations.  The tonsillar pillars and soft palate were symmetric.  The uvula was midline on elevation.  The tonsil beds are healthy, clean and re mucosalized nicely.  There is perhaps a small mucocele at the inferior pole of the LEFT fossa  Neck - Normal midline excursion of the laryngotracheal complex during swallowing.  Full range of motion on passive movement.  Palpation of the occipital, submental, submandibular, internal jugular chain, and supraclavicular nodes did not demonstrate any abnormal lymph nodes or masses.  The carotid pulse was palpable bilaterally.  Palpation of the thyroid was soft and smooth, with no nodules or goiter appreciated.  The trachea was mobile and midline.    Flexible Endoscopy -     Attempts at mirror laryngoscopy were not possible due to gag reflex.  Therefore I proceeded with a fiberoptic examination.  First I sprayed both sides of the nose with a mixture of lidocaine and neosynephrine.  I then passed the scope through the nasal cavity.  Color photographs were taken for the permanent medical record.  The nasal cavity was unremarkable.  The nasopharynx was mucosally covered and symmetric.  The Eustachian tube openings were unobstructed.  Going further down I had a clear view of the base of tongue which had normal appearing lingual tonsillar tissue.  The base of tongue was free of lesions, and the vallecula was open.  The epiglottis was smooth and mucosally covered.  The supraglottic larynx was then clearly visualized.  The vocal cords moved smoothly and symmetrically, they were pearly white and no lesions were seen.  The pyriform sinuses were open, and the limited view of the  postcricoid region did not show any lesions.        A/P - Maria A Osuna is a 40 year old female    ICD-10-CM    1. Acute streptococcal pharyngitis  J02.0    2. History of tonsillectomy  Z90.89      I suspect that she might have a small scratch from eating granola. Or perhaps she can feel that tiny mucocele.  Either way, based on exam and endoscopy, I do not see any sign of regrowth of tonsil tissue, or any other disease process in the throat.  Give it a few weeks, and call if not resolving.

## 2020-12-14 ENCOUNTER — HEALTH MAINTENANCE LETTER (OUTPATIENT)
Age: 40
End: 2020-12-14

## 2021-04-17 ENCOUNTER — HEALTH MAINTENANCE LETTER (OUTPATIENT)
Age: 41
End: 2021-04-17

## 2021-10-02 ENCOUNTER — HEALTH MAINTENANCE LETTER (OUTPATIENT)
Age: 41
End: 2021-10-02

## 2022-05-14 ENCOUNTER — HEALTH MAINTENANCE LETTER (OUTPATIENT)
Age: 42
End: 2022-05-14

## 2022-09-03 ENCOUNTER — HEALTH MAINTENANCE LETTER (OUTPATIENT)
Age: 42
End: 2022-09-03

## 2023-06-02 ENCOUNTER — HEALTH MAINTENANCE LETTER (OUTPATIENT)
Age: 43
End: 2023-06-02

## 2023-07-03 ENCOUNTER — OFFICE VISIT (OUTPATIENT)
Dept: URGENT CARE | Facility: URGENT CARE | Age: 43
End: 2023-07-03
Payer: COMMERCIAL

## 2023-07-03 VITALS
RESPIRATION RATE: 15 BRPM | SYSTOLIC BLOOD PRESSURE: 112 MMHG | TEMPERATURE: 97.8 F | BODY MASS INDEX: 20.89 KG/M2 | OXYGEN SATURATION: 99 % | WEIGHT: 133.4 LBS | DIASTOLIC BLOOD PRESSURE: 75 MMHG | HEART RATE: 81 BPM

## 2023-07-03 DIAGNOSIS — H65.91 OME (OTITIS MEDIA WITH EFFUSION), RIGHT: ICD-10-CM

## 2023-07-03 DIAGNOSIS — H61.21 IMPACTED CERUMEN OF RIGHT EAR: Primary | ICD-10-CM

## 2023-07-03 PROCEDURE — 69210 REMOVE IMPACTED EAR WAX UNI: CPT | Mod: RT | Performed by: EMERGENCY MEDICINE

## 2023-07-03 PROCEDURE — 99203 OFFICE O/P NEW LOW 30 MIN: CPT | Mod: 25 | Performed by: EMERGENCY MEDICINE

## 2023-07-03 NOTE — PROGRESS NOTES
Assessment & Plan     Diagnosis:    ICD-10-CM    1. Impacted cerumen of right ear  H61.21 REMOVE IMPACTED CERUMEN      2. OME (otitis media with effusion), right  H65.91           Medical Decision Making:  Maria A Osuna is a 43 year old female who presents for evaluation of otalgia on the right side with muffled hearing.  The patient has an exam consistent with cerumen impcation which was removed per the below procedure note.  TM has slight effusion noted behind the eardrum, no evidence of otitis media or externa.  No signs of mastoiditis, meningitis, perforation,  mass, dental abscess, or peritonsillar abscess.  She may take Tylenol or Ibuprofen for pain.  Return if increasing pain, fever, decrease in hearing or ear discharge.  Follow-up with primary physician in 7-10 days, if symptoms persist then an ENT consultation may be needed as outpatient.  Patient voices understanding and agreement with the plan.  Questions answered.    Hima Rondon PA-C  [facility]    Subjective     Maria A Osuna is a 43 year old female who presents with  to clinic today for the following health issues:  Chief Complaint   Patient presents with     Otalgia     Possinle ear infection in right ear, going on 2 weeks, pt was rx antibiotics, said she had water behind ears . Pt is flying out tomorrow and would like to make sure she is ok        HPI    For last 2-week she has been experiencing right-sided ear pain and slight pressure.  She notes that her hearing is still muffled.  She notes that she did have a sinus like infection a couple weeks ago and was put on Augmentin for 1 week, states that this improved her sinus symptoms and cough, but her ear pressure/muffled hearing persists and this did not improve with antibiotics.  She denies any fevers, cough, sore throat, loss of hearing, discharge from the ears or other concerns.      Review of Systems    See HPI    Objective      Vitals: /75 (BP  Location: Left arm, Patient Position: Sitting, Cuff Size: Adult Regular)   Pulse 81   Temp 97.8  F (36.6  C) (Tympanic)   Resp 15   Wt 60.5 kg (133 lb 6.4 oz)   SpO2 99%   BMI 20.89 kg/m        Patient Vitals for the past 24 hrs:   BP Temp Temp src Pulse Resp SpO2 Weight   07/03/23 1043 112/75 97.8  F (36.6  C) Tympanic 81 15 99 % 60.5 kg (133 lb 6.4 oz)       Vital signs reviewed by: Hima Rondon PA-C    Physical Exam   Constitutional: Alert and active. With caregiver; in no acute distress.  HENT: Ears: Right TM and canal obstructed by cerumen. Left TM and canal are normal/clear No perforation.No tenderness with manipulation of the pinnae and tragus. No mastoid tenderness bilaterally.  Nose: Nose normal.    Mouth: Normal tongue and tonsil. Posterior oropharynx is clear. Uvula is midline.  Cardiovascular: Regular rate and rhythm  Pulmonary/Chest: Effort normal. No respiratory distress. Lungs clear to auscultation bilaterally.  Skin: No rash noted on visualized skin or face.      Procedure:  PROCEDURE NOTE: Cerumen disimpaction  Provider: Hima Rondon PA-C  Indications: Otalgia (right ear), cerumen disimpaction   Consent: Verbal  Description of Procedure:  The patient was placed in the supine position.  Using direct visualization the cerumen was slowly removed piecemeal from the ear with a soft-looped plastic curette.  Patients hearing was improved.  I was able to get all wax foreign body removed.  The TM was slightly bulging with effusion behind TM noted, no erythema or perforation. No complications. Patient tolerated procedure well.      Hima Rondon PA-C, July 3, 2023

## 2024-02-17 ENCOUNTER — HEALTH MAINTENANCE LETTER (OUTPATIENT)
Age: 44
End: 2024-02-17

## 2024-06-17 PROBLEM — Z71.89 ADVANCED DIRECTIVES, COUNSELING/DISCUSSION: Status: RESOLVED | Noted: 2017-06-26 | Resolved: 2024-06-17

## 2024-07-06 ENCOUNTER — HEALTH MAINTENANCE LETTER (OUTPATIENT)
Age: 44
End: 2024-07-06

## 2025-06-13 ENCOUNTER — RESULTS FOLLOW-UP (OUTPATIENT)
Dept: URGENT CARE | Facility: URGENT CARE | Age: 45
End: 2025-06-13

## 2025-06-26 ENCOUNTER — OFFICE VISIT (OUTPATIENT)
Dept: URGENT CARE | Facility: URGENT CARE | Age: 45
End: 2025-06-26
Payer: COMMERCIAL

## 2025-06-26 VITALS
HEART RATE: 96 BPM | TEMPERATURE: 98.3 F | WEIGHT: 141.8 LBS | OXYGEN SATURATION: 98 % | BODY MASS INDEX: 22.21 KG/M2 | RESPIRATION RATE: 18 BRPM | DIASTOLIC BLOOD PRESSURE: 77 MMHG | SYSTOLIC BLOOD PRESSURE: 116 MMHG

## 2025-06-26 DIAGNOSIS — N39.0 RECURRENT UTI (URINARY TRACT INFECTION): Primary | ICD-10-CM

## 2025-06-26 LAB
ALBUMIN UR-MCNC: NEGATIVE MG/DL
APPEARANCE UR: CLEAR
BACTERIA #/AREA URNS HPF: ABNORMAL /HPF
BILIRUB UR QL STRIP: NEGATIVE
COLOR UR AUTO: YELLOW
GLUCOSE UR STRIP-MCNC: NEGATIVE MG/DL
HGB UR QL STRIP: ABNORMAL
KETONES UR STRIP-MCNC: ABNORMAL MG/DL
LEUKOCYTE ESTERASE UR QL STRIP: ABNORMAL
MUCOUS THREADS #/AREA URNS LPF: PRESENT /LPF
NITRATE UR QL: NEGATIVE
PH UR STRIP: 5.5 [PH] (ref 5–7)
RBC #/AREA URNS AUTO: ABNORMAL /HPF
SP GR UR STRIP: >=1.03 (ref 1–1.03)
SQUAMOUS #/AREA URNS AUTO: ABNORMAL /LPF
UROBILINOGEN UR STRIP-ACNC: 0.2 E.U./DL
WBC #/AREA URNS AUTO: ABNORMAL /HPF

## 2025-06-26 RX ORDER — NITROFURANTOIN 25; 75 MG/1; MG/1
100 CAPSULE ORAL 2 TIMES DAILY
Qty: 10 CAPSULE | Refills: 0 | Status: SHIPPED | OUTPATIENT
Start: 2025-06-26 | End: 2025-06-26

## 2025-06-26 RX ORDER — SULFAMETHOXAZOLE AND TRIMETHOPRIM 800; 160 MG/1; MG/1
1 TABLET ORAL 2 TIMES DAILY
Qty: 20 TABLET | Refills: 0 | Status: SHIPPED | OUTPATIENT
Start: 2025-06-26 | End: 2025-07-06

## 2025-06-26 NOTE — PROGRESS NOTES
Assessment & Plan     Diagnosis:    ICD-10-CM    1. Recurrent UTI (urinary tract infection)  N39.0 UA Macroscopic with reflex to Microscopic and Culture - Lab Collect     UA Macroscopic with reflex to Microscopic and Culture - Lab Collect     UA Microscopic with Reflex to Culture     Urine Culture     Adult Urology [facility] Referral     sulfamethoxazole-trimethoprim (BACTRIM DS) 800-160 MG tablet     DISCONTINUED: nitroFURantoin macrocrystal-monohydrate (MACROBID) 100 MG capsule          Medical Decision Making:  Maria A Osuna is a 45 year old female who presents to clinic today for evaluation of urinary frequency, urgency and dysuria.     This clinically is consistent with a urinary tract infection.  Urinalysis confirms the infection. Urinalysis confirms the infection. Given this is now her third round of treatment (1st was when she was in [place] on Macrobid, 2nd w/ UC showing group B strep and she did 10 days of cefdinir with minimal improvement).  Would treat with a pyelonephritis course of antibiotics to be safe although more likely this is still just a UTI at this time.  There are no systemic symptoms present including flank pain.  There is no clinical evidence of appendicitis, colitis, diverticulitis or any intraabdominal catastrophe. The patient will be started on antibiotics for the infection. Go to the ER if increasing pain, vomiting, fever, or inability to tolerate the oral antibiotic.  Urine culture was sent and a provider will contact the patient if a change of antibiotics is indicated. Follow up with primary physician is indicated if not improving in 2-3 days. Follow up with Urology if this recurs again or other bladder/urinary concerns.  The patient verbalized understanding and agreement with the plan including reasons to go to the emergency room.  Patient was discharged in stable condition.    Hima Rondon PA-C  [facility]    Subjective     Maria A Osuna is a 45  year old female who presents to clinic today for the following health issues:  Chief Complaint   Patient presents with    UTI     Sx frequency - urinating small amounts.          HPI  Patient states that for the past 24 hours she has experienced a burning sensation, and frequency and urgency of urination. This started a few weeks ago when she was in [place] and diagnosed with a UTI, did 5 days of Macrobid.  She felt slightly improved but had recurrence of symptoms shortly after.  She then did 10 days of cefdinir, this finished 2 days ago and while she had some improvement her symptoms seem to come right back yesterday.  She notes no history of recurrent UTIs, history of diabetes, HIV or other immunocompromise state.  She had some mild suprapubic abdominal pain, does not seem to radiate anywhere including elsewhere in the abdomen or to the back or flank. Patient denies any fever, nausea, vomiting, diarrhea, inability to urinate, vaginal discharge/bleeding or concerns for STDS or pregnancy.     Review of Systems    See HPI    Objective      Vitals:    Patient Vitals for the past 24 hrs:   BP Temp Temp src Pulse Resp SpO2 Weight   06/26/25 1043 116/77 98.3  F (36.8  C) Oral 96 18 98 % 64.3 kg (141 lb 12.8 oz)         Vital signs reviewed by: Hima Rondon PA-C    Physical Exam   Constitutional: The patient is oriented to person, place, and time. Alert and cooperative. No acute distress.  Mouth: Mucous membranes are moist.  Cardiovascular: Regular rate and rhythm.  Pulmonary/Chest: Effort normal. No respiratory distress.   GI:  Abdomen with mild suprapubic tenderness to palpation. No rebound or guarding. No McBurney point tenderness.   MSK: No CVA tenderness bilaterally.  Neurological: Alert and oriented x3.     Labs/Imaging:    Results for orders placed or performed in visit on 06/26/25   UA Macroscopic with reflex to Microscopic and Culture - Lab Collect     Status: Abnormal    Specimen: Urine, Clean Catch   Result  Value Ref Range    Color Urine Yellow Colorless, Straw, Light Yellow, Yellow    Appearance Urine Clear Clear    Glucose Urine Negative Negative mg/dL    Bilirubin Urine Negative Negative    Ketones Urine Trace (A) Negative mg/dL    Specific Gravity Urine >=1.030 1.003 - 1.035    Blood Urine Small (A) Negative    pH Urine 5.5 5.0 - 7.0    Protein Albumin Urine Negative Negative mg/dL    Urobilinogen Urine 0.2 0.2, 1.0 E.U./dL    Nitrite Urine Negative Negative    Leukocyte Esterase Urine Small (A) Negative   UA Microscopic with Reflex to Culture     Status: Abnormal   Result Value Ref Range    Bacteria Urine Moderate (A) None Seen /HPF    RBC Urine 2-5 (A) 0-2 /HPF /HPF    WBC Urine 10-25 (A) 0-5 /HPF /HPF    Squamous Epithelials Urine Moderate (A) None Seen /LPF    Mucus Urine Present (A) None Seen /LPF     Urine Culture Pending      Hima Rondon PA-C, June 26, 2025

## 2025-06-26 NOTE — PROGRESS NOTES
Urgent Care Clinic Visit    Chief Complaint   Patient presents with    UTI     Sx frequency - urinating small amounts.                6/26/2025    10:42 AM   Additional Questions   Roomed by Liliana Drew   Accompanied by self     Pre-Provider Visit Orders- Urinalysis UA/UC  Patient reports the following symptoms:  possible urinary tract infection (UTI) , possible bladder infection , frequent urination , and feeling the need to urinate despite having an empty bladder   Does the patient report any of the following symptoms: vaginal discharge, vaginal itching, possible yeast infection, has a urinary catheter in place, or unable to void in a specimen cup?  No

## 2025-06-27 ENCOUNTER — RESULTS FOLLOW-UP (OUTPATIENT)
Dept: URGENT CARE | Facility: URGENT CARE | Age: 45
End: 2025-06-27

## 2025-07-01 ENCOUNTER — OFFICE VISIT (OUTPATIENT)
Dept: UROLOGY | Facility: CLINIC | Age: 45
End: 2025-07-01
Attending: EMERGENCY MEDICINE
Payer: COMMERCIAL

## 2025-07-01 VITALS
WEIGHT: 141 LBS | TEMPERATURE: 97.4 F | BODY MASS INDEX: 22.13 KG/M2 | SYSTOLIC BLOOD PRESSURE: 106 MMHG | DIASTOLIC BLOOD PRESSURE: 68 MMHG | HEIGHT: 67 IN

## 2025-07-01 DIAGNOSIS — N39.0 RECURRENT UTI (URINARY TRACT INFECTION): ICD-10-CM

## 2025-07-01 LAB
ALBUMIN UR-MCNC: 10 MG/DL
APPEARANCE UR: ABNORMAL
BACTERIA #/AREA URNS HPF: ABNORMAL /HPF
BILIRUB UR QL STRIP: NEGATIVE
COLOR UR AUTO: YELLOW
GLUCOSE UR STRIP-MCNC: NEGATIVE MG/DL
HGB UR QL STRIP: NEGATIVE
KETONES UR STRIP-MCNC: NEGATIVE MG/DL
LEUKOCYTE ESTERASE UR QL STRIP: ABNORMAL
MUCOUS THREADS #/AREA URNS LPF: PRESENT /LPF
NITRATE UR QL: NEGATIVE
PH UR STRIP: 6 [PH] (ref 5–7)
RBC URINE: 0 /HPF
SP GR UR STRIP: 1.03 (ref 1–1.03)
SQUAMOUS EPITHELIAL: 8 /HPF
UROBILINOGEN UR STRIP-MCNC: NORMAL MG/DL
WBC URINE: 69 /HPF

## 2025-07-01 PROCEDURE — 81001 URINALYSIS AUTO W/SCOPE: CPT | Performed by: UROLOGY

## 2025-07-01 PROCEDURE — 3078F DIAST BP <80 MM HG: CPT | Performed by: UROLOGY

## 2025-07-01 PROCEDURE — 52000 CYSTOURETHROSCOPY: CPT | Performed by: UROLOGY

## 2025-07-01 PROCEDURE — 1125F AMNT PAIN NOTED PAIN PRSNT: CPT | Performed by: UROLOGY

## 2025-07-01 PROCEDURE — 99203 OFFICE O/P NEW LOW 30 MIN: CPT | Mod: 25 | Performed by: UROLOGY

## 2025-07-01 PROCEDURE — 3074F SYST BP LT 130 MM HG: CPT | Performed by: UROLOGY

## 2025-07-01 ASSESSMENT — PAIN SCALES - GENERAL: PAINLEVEL_OUTOF10: MILD PAIN (2)

## 2025-07-01 NOTE — PROGRESS NOTES
"Maria A Osuna is a 45 year old female seen in consultation for uti. Consult from No Ref-Primary, Physician.      5/25 UTI in [place]   RX: Macrobid      6/13/25 Frequency, dysuria   UA: negative, <1.005   UC: few Strep   RX: Cefdinir x 10 days      6/26/25   UA: negative, > 1.030   UC: negative   RX: Bactrim      At this point pt states that \"I can feel my bladder and I've already pee'd 5 times this morning. It is still burning a small amount, not like hot barbed wire. My bladder just feels heavy.\"      Denies dysuria, gross hematuria, daytime frequency, significant urinary incontinence.     Denies prior  eval, hx bladder surgery, use of bladder meds.    Hx 2 vag deliveries, IUD.    Hx IBS, stable today.    Drinks 1 soda, 6-10 [place] per day.     Wipes properly. Uses showers.      Reviewed PMH in detail including IBS      Past Medical History:   Diagnosis Date    Irritable bowel syndrome 5/12/2015       Past Surgical History:   Procedure Laterality Date    TONSILLECTOMY, ADENOIDECTOMY, COMBINED Bilateral 7/6/2017    Procedure: COMBINED TONSILLECTOMY, ADENOIDECTOMY;  Tonsillectomy ;  Surgeon: Luis F Phillips MD;  Location:  OR    [facility]is[facility] teeth[  2000       Social History     Socioeconomic History    Marital status:      Spouse name: Not on file    Number of children: Not on file    Years of education: Not on file    Highest education level: Not on file   Occupational History    Not on file   Tobacco Use    Smoking status: Never    Smokeless tobacco: Never   Vaping Use    Vaping status: Never Used   Substance and Sexual Activity    Alcohol use: No    Drug use: No    Sexual activity: Yes     Partners: Male     Birth control/protection: None, Male Surgical   Other Topics Concern    Parent/sibling w/ CABG, MI or angioplasty before 65F 55M? Not Asked   Social History Narrative    Not on file     Social Drivers of Health     Financial Resource Strain: Not At Risk (6/6/2023)    Received from " [facility]    Financial Resource Strain     Is it hard for you to pay for the very basics like food, housing, medical care or heating?: No   Food Insecurity: Not At Risk (6/6/2023)    Received from [facility]    Food Insecurity     Does your food run out before you have the money to buy more?: No   Transportation Needs: Not At Risk (6/6/2023)    Received from [facility]    Transportation Needs     Does a lack of transportation keep you from your medical appointments or from getting your medications?: No   Physical Activity: Not on file   Stress: Not on file   Social Connections: Not on file   Interpersonal Safety: Not on file   Housing Stability: Not on file       Current Outpatient Medications   Medication Sig Dispense Refill    sulfamethoxazole-trimethoprim (BACTRIM DS) 800-160 MG tablet Take 1 tablet by mouth 2 times daily for 10 days. 20 tablet 0       Physical Exam:    GENL: NAD.    ABD: Soft, non-tender, no masses.    EG: Well-estrogenized, no masses.    VAGINA: Well-estrogenized, no masses.    BN HYPERMOBILITY: None.    CYSTOCELE: None.    APICAL PROLAPSE: None.    RECTOCELE: None.    BIMANUAL: No mass or tenderness.    Cysto:    (Informed consent obtained. Pause for cause performed)   Sterile prep.    17 Fr scope inserted through urethra. Systematic examination w 70 degree lens.   PVR: 5 cc   MUCOSA: Normal without lesion   ORIFICES: Normal location and morphology   CAPACITY: 400 cc; no pain with filling   Scope withdrawn without untoward effect.      (Pt tolerated procedure without difficulty).                  Today:    Results for orders placed or performed in visit on 07/01/25   Urine Macroscopic with reflex to Microscopic     Status: Abnormal   Result Value Ref Range    Color Urine Yellow Colorless, Straw, Light Yellow, Yellow    Appearance Urine Slightly Cloudy (A) Clear    Glucose Urine Negative Negative mg/dL    Bilirubin Urine Negative Negative    Ketones Urine Negative Negative mg/dL     Specific Gravity Urine 1.027 1.003 - 1.035    Blood Urine Negative Negative    pH Urine 6.0 5.0 - 7.0    Protein Albumin Urine 10 (A) Negative mg/dL    Urobilinogen Urine Normal Normal mg/dL    Nitrite Urine Negative Negative    Leukocyte Esterase Urine Large (A) Negative    Bacteria Urine Few (A) None Seen /HPF    RBC Urine 0 <=2 /HPF    WBC Urine 69 (H) <=5 /HPF    Squamous Epithelials Urine 8 (H) <=1 /HPF    Mucus Urine Present (A) None Seen /LPF         IMP:  1. Recent UTI, resolved  2. Large [place] consumption      PLAN:  1. Discussed situation with patient in detail.  2. Stressed importance of UC prior to emperic rx with abx  3. Moderate fluids  4. Work on IBS  5. RTC to us only PRN  6. Total time spent in reviewing patient records, discussing history, performing exam, discussing diagnosis, outlining treatment plan and documentation: 60 minutes

## 2025-07-13 ENCOUNTER — HEALTH MAINTENANCE LETTER (OUTPATIENT)
Age: 45
End: 2025-07-13

## (undated) DEVICE — BASIN SET MINOR DISP

## (undated) DEVICE — DRAPE SHEET HALF 40X60" 9358

## (undated) DEVICE — ESU SUCTION CAUTERY 10FR FOOT CONTROL E2505-10FR

## (undated) DEVICE — NDL 19GA 1.5"

## (undated) DEVICE — SOL WATER IRRIG 1000ML BOTTLE 07139-09

## (undated) DEVICE — SUCTION CANISTER MEDIVAC LINER 1500ML W/LID 65651-515

## (undated) DEVICE — SUCTION TIP YANKAUER W/O VENT K86

## (undated) DEVICE — SYR 10ML FINGER CONTROL W/O NDL 309695

## (undated) DEVICE — ESU ELEC BLADE 2.75" COATED/INSULATED E1455

## (undated) DEVICE — PACK SET-UP STD 9102

## (undated) DEVICE — ESU PENCIL W/HOLSTER

## (undated) DEVICE — GLOVE PROTEXIS W/NEU-THERA 7.5  2D73TE75

## (undated) DEVICE — GLOVE PROTEXIS BLUE W/NEU-THERA 7.5  2D73EB75

## (undated) DEVICE — GLOVE PROTEXIS W/NEU-THERA 7.0  2D73TE70

## (undated) DEVICE — NDL 25GA 1.5" 305127

## (undated) DEVICE — MARKER SKIN DOUBLE TIP W/FLEXI-RULER W/LABELS

## (undated) DEVICE — TUBING SUCTION 10'X3/16" N510